# Patient Record
Sex: FEMALE | Race: WHITE | Employment: FULL TIME | URBAN - METROPOLITAN AREA
[De-identification: names, ages, dates, MRNs, and addresses within clinical notes are randomized per-mention and may not be internally consistent; named-entity substitution may affect disease eponyms.]

---

## 2017-11-21 PROBLEM — Z3A.38 38 WEEKS GESTATION OF PREGNANCY: Status: ACTIVE | Noted: 2017-11-21

## 2017-11-30 PROBLEM — Z3A.40 40 WEEKS GESTATION OF PREGNANCY: Status: ACTIVE | Noted: 2017-11-30

## 2021-09-30 ENCOUNTER — HOSPITAL ENCOUNTER (OUTPATIENT)
Age: 31
Discharge: HOME | End: 2021-10-02
Payer: COMMERCIAL

## 2021-09-30 PROCEDURE — 88305 TISSUE EXAM BY PATHOLOGIST: CPT

## 2021-10-14 ENCOUNTER — HOSPITAL ENCOUNTER (EMERGENCY)
Age: 31
Discharge: HOME | End: 2021-10-14
Attending: EMERGENCY MEDICINE
Payer: COMMERCIAL

## 2021-10-14 ENCOUNTER — APPOINTMENT (OUTPATIENT)
Dept: GENERAL RADIOLOGY | Age: 31
End: 2021-10-14
Payer: COMMERCIAL

## 2021-10-14 VITALS
DIASTOLIC BLOOD PRESSURE: 69 MMHG | BODY MASS INDEX: 27.31 KG/M2 | HEIGHT: 64 IN | HEART RATE: 107 BPM | OXYGEN SATURATION: 97 % | WEIGHT: 160 LBS | SYSTOLIC BLOOD PRESSURE: 116 MMHG | RESPIRATION RATE: 14 BRPM | TEMPERATURE: 98.8 F

## 2021-10-14 DIAGNOSIS — M54.41 ACUTE RIGHT-SIDED LOW BACK PAIN WITH RIGHT-SIDED SCIATICA: Primary | ICD-10-CM

## 2021-10-14 LAB
HCG, URINE, POC: NEGATIVE
Lab: NORMAL
NEGATIVE QC PASS/FAIL: NORMAL
POSITIVE QC PASS/FAIL: NORMAL

## 2021-10-14 PROCEDURE — 96372 THER/PROPH/DIAG INJ SC/IM: CPT

## 2021-10-14 PROCEDURE — 6370000000 HC RX 637 (ALT 250 FOR IP): Performed by: EMERGENCY MEDICINE

## 2021-10-14 PROCEDURE — 72100 X-RAY EXAM L-S SPINE 2/3 VWS: CPT

## 2021-10-14 PROCEDURE — 6360000002 HC RX W HCPCS: Performed by: EMERGENCY MEDICINE

## 2021-10-14 PROCEDURE — 99284 EMERGENCY DEPT VISIT MOD MDM: CPT

## 2021-10-14 RX ORDER — KETOROLAC TROMETHAMINE 30 MG/ML
30 INJECTION, SOLUTION INTRAMUSCULAR; INTRAVENOUS ONCE
Status: COMPLETED | OUTPATIENT
Start: 2021-10-14 | End: 2021-10-14

## 2021-10-14 RX ORDER — MORPHINE SULFATE 2 MG/ML
6 INJECTION, SOLUTION INTRAMUSCULAR; INTRAVENOUS ONCE
Status: COMPLETED | OUTPATIENT
Start: 2021-10-14 | End: 2021-10-14

## 2021-10-14 RX ORDER — ORPHENADRINE CITRATE 100 MG/1
100 TABLET, EXTENDED RELEASE ORAL 2 TIMES DAILY
Qty: 20 TABLET | Refills: 0 | Status: SHIPPED | OUTPATIENT
Start: 2021-10-14 | End: 2021-10-24

## 2021-10-14 RX ORDER — OXYCODONE HYDROCHLORIDE AND ACETAMINOPHEN 5; 325 MG/1; MG/1
1 TABLET ORAL EVERY 6 HOURS PRN
Qty: 12 TABLET | Refills: 0 | Status: SHIPPED | OUTPATIENT
Start: 2021-10-14 | End: 2021-10-17

## 2021-10-14 RX ORDER — METHYLPREDNISOLONE 4 MG/1
TABLET ORAL
Qty: 1 KIT | Refills: 0 | Status: SHIPPED | OUTPATIENT
Start: 2021-10-14 | End: 2021-10-20

## 2021-10-14 RX ORDER — PREDNISONE 20 MG/1
60 TABLET ORAL ONCE
Status: COMPLETED | OUTPATIENT
Start: 2021-10-14 | End: 2021-10-14

## 2021-10-14 RX ORDER — ORPHENADRINE CITRATE 30 MG/ML
60 INJECTION INTRAMUSCULAR; INTRAVENOUS ONCE
Status: COMPLETED | OUTPATIENT
Start: 2021-10-14 | End: 2021-10-14

## 2021-10-14 RX ADMIN — ORPHENADRINE CITRATE 60 MG: 30 INJECTION INTRAMUSCULAR; INTRAVENOUS at 08:31

## 2021-10-14 RX ADMIN — PREDNISONE 60 MG: 20 TABLET ORAL at 08:31

## 2021-10-14 RX ADMIN — MORPHINE SULFATE 6 MG: 2 INJECTION, SOLUTION INTRAMUSCULAR; INTRAVENOUS at 09:17

## 2021-10-14 RX ADMIN — KETOROLAC TROMETHAMINE 30 MG: 30 INJECTION, SOLUTION INTRAMUSCULAR at 08:31

## 2021-10-14 ASSESSMENT — PAIN DESCRIPTION - LOCATION: LOCATION: BACK;LEG

## 2021-10-14 ASSESSMENT — PAIN SCALES - GENERAL
PAINLEVEL_OUTOF10: 10
PAINLEVEL_OUTOF10: 9
PAINLEVEL_OUTOF10: 9

## 2021-10-14 ASSESSMENT — PAIN DESCRIPTION - ORIENTATION: ORIENTATION: RIGHT;LOWER

## 2021-10-14 ASSESSMENT — PAIN DESCRIPTION - FREQUENCY: FREQUENCY: CONTINUOUS

## 2021-10-14 ASSESSMENT — PAIN DESCRIPTION - DESCRIPTORS: DESCRIPTORS: PATIENT UNABLE TO DESCRIBE

## 2021-10-14 ASSESSMENT — PAIN DESCRIPTION - PROGRESSION: CLINICAL_PROGRESSION: NOT CHANGED

## 2021-10-14 NOTE — ED PROVIDER NOTES
HPI:  10/14/21, Time: 8:35 AM EDT         [address] is a 33 y.o. female presenting to the ED for back pain beginning several months ago. Symptoms have been moderate to severe in severity, constant, worsening since onset. Symptoms are exacerbated by movement and sitting and alleviated by nothing. She has been taking Motrin as well as 1 Percocet at home without relief. She states she has intermittent tingling to her right lower extremity but no associated symptoms of numbness or weakness. No bowel or bladder incontinence or retention, fevers, saddle anesthesia, or trauma. States that she has been having a sharp pain mainly in her right buttocks radiating down her right leg. She states that she was recently pregnant and had a miscarriage. She underwent D&C recently. States that her vaginal bleeding has resolved. She denies chest pain, shortness of breath, abdominal pain, fevers, nausea, vomiting, diarrhea, and dysuria. She has been seeing a chiropractor, but she has not yet seen a physician. She has a referral to a pain management doctor at [facility] though she has not yet scheduled an appointment. Review of Systems:   Pertinent positives and negatives are stated within HPI, all other systems reviewed and are negative.          --------------------------------------------- PAST HISTORY ---------------------------------------------  Past Medical History:  has a past medical history of Abnormal Pap smear of cervix, HPV (human papilloma virus) anogenital infection, and Kidney stone. Past Surgical History:  has a past surgical history that includes Tympanostomy tube placement (5608) and Colposcopy (2013). Social History:  reports that she has never smoked. She has never used smokeless tobacco. She reports current alcohol use. She reports that she does not use drugs. Family History: family history is not on file. The patients home medications have been reviewed.     Allergies: Patient has no known allergies. -------------------------------------------------- RESULTS -------------------------------------------------  All laboratory and radiology results have been personally reviewed by myself   LABS:  Results for orders placed or performed during the hospital encounter of 10/14/21   POC Pregnancy Urine   Result Value Ref Range    HCG, Urine, POC Negative Negative    Lot Number [id number]     Positive QC Pass/Fail Pass     Negative QC Pass/Fail Pass        RADIOLOGY:  Interpreted by Radiologist.  XR LUMBAR SPINE (2-3 VIEWS)   Final Result   Partially sacralized L5 noted      Otherwise unremarkable study             ------------------------- NURSING NOTES AND VITALS REVIEWED ---------------------------   The nursing notes within the ED encounter and vital signs as below have been reviewed. /69   Pulse 107   Temp 98.8 °F (37.1 °C) (Oral)   Resp 14   Ht 5' 4\" (1.626 m)   Wt 160 lb (72.6 kg)   SpO2 97%   BMI 27.46 kg/m²   Oxygen Saturation Interpretation: Normal      ---------------------------------------------------PHYSICAL EXAM--------------------------------------      Constitutional/General: Alert and oriented x3, appears uncomfortable, non toxic in NAD  Head: Normocephalic and atraumatic  Mouth: Oropharynx clear, handling secretions, no trismus  Neck: Supple, full ROM,   Pulmonary: Lungs clear to auscultation bilaterally, no wheezes, rales, or rhonchi. Not in respiratory distress  Cardiovascular:  Regular rate and rhythm, no murmurs, gallops, or rubs. 2+ distal pulses  Abdomen: Soft, non tender, non distended,   Back: No midline thoracic or lumbar tenderness, tenderness to right sciatic notch, 5/5 strength in lower extremities with normal sensation to lower extremities. Extremities: Moves all extremities x 4. Warm and well perfused  Skin: warm and dry without rash  Neurologic: GCS 15, no focal motor or sensory deficits   Psych: Normal Affect.  Behavior with the plan.      --------------------------------- IMPRESSION AND DISPOSITION ---------------------------------    IMPRESSION  1. Acute right-sided low back pain with right-sided sciatica        DISPOSITION  Disposition: Discharge to home  Patient condition is stable      NOTE: This report was transcribed using voice recognition software.  Every effort was made to ensure accuracy; however, inadvertent computerized transcription errors may be present    I, Millie Lau MD, am the primary provider of this record       Millie Lau MD  10/14/21 2316

## 2022-03-30 ENCOUNTER — APPOINTMENT (OUTPATIENT)
Dept: ULTRASOUND IMAGING | Age: 32
DRG: 831 | End: 2022-03-30
Payer: COMMERCIAL

## 2022-03-30 ENCOUNTER — HOSPITAL ENCOUNTER (INPATIENT)
Age: 32
LOS: 3 days | Discharge: HOME | DRG: 831 | End: 2022-04-03
Attending: EMERGENCY MEDICINE | Admitting: INTERNAL MEDICINE
Payer: COMMERCIAL

## 2022-03-30 DIAGNOSIS — E87.6 HYPOKALEMIA: ICD-10-CM

## 2022-03-30 DIAGNOSIS — R10.9 FLANK PAIN: ICD-10-CM

## 2022-03-30 DIAGNOSIS — N30.00 ACUTE CYSTITIS WITHOUT HEMATURIA: ICD-10-CM

## 2022-03-30 DIAGNOSIS — A41.9 SEPTICEMIA (HCC): Primary | ICD-10-CM

## 2022-03-30 LAB
ALBUMIN SERPL-MCNC: 4.2 G/DL (ref 3.5–5.2)
ALP BLD-CCNC: 36 U/L (ref 35–104)
ALT SERPL-CCNC: 13 U/L (ref 0–32)
ANION GAP SERPL CALCULATED.3IONS-SCNC: 12 MMOL/L (ref 7–16)
AST SERPL-CCNC: 15 U/L (ref 0–31)
BACTERIA: ABNORMAL /HPF
BASOPHILS ABSOLUTE: 0.07 E9/L (ref 0–0.2)
BASOPHILS RELATIVE PERCENT: 0.3 % (ref 0–2)
BILIRUB SERPL-MCNC: 0.2 MG/DL (ref 0–1.2)
BILIRUBIN URINE: NEGATIVE
BLOOD, URINE: NEGATIVE
BUN BLDV-MCNC: 6 MG/DL (ref 6–20)
CALCIUM SERPL-MCNC: 9 MG/DL (ref 8.6–10.2)
CHLORIDE BLD-SCNC: 99 MMOL/L (ref 98–107)
CLARITY: ABNORMAL
CO2: 20 MMOL/L (ref 22–29)
COLOR: YELLOW
CREAT SERPL-MCNC: 0.6 MG/DL (ref 0.5–1)
EOSINOPHILS ABSOLUTE: 0.07 E9/L (ref 0.05–0.5)
EOSINOPHILS RELATIVE PERCENT: 0.3 % (ref 0–6)
EPITHELIAL CELLS, UA: ABNORMAL /HPF
GFR AFRICAN AMERICAN: >60
GFR NON-AFRICAN AMERICAN: >60 ML/MIN/1.73
GLUCOSE BLD-MCNC: 112 MG/DL (ref 74–99)
GLUCOSE URINE: NEGATIVE MG/DL
HCT VFR BLD CALC: 34.6 % (ref 34–48)
HEMOGLOBIN: 12.2 G/DL (ref 11.5–15.5)
IMMATURE GRANULOCYTES #: 0.2 E9/L
IMMATURE GRANULOCYTES %: 0.9 % (ref 0–5)
KETONES, URINE: NEGATIVE MG/DL
LEUKOCYTE ESTERASE, URINE: ABNORMAL
LYMPHOCYTES ABSOLUTE: 0.72 E9/L (ref 1.5–4)
LYMPHOCYTES RELATIVE PERCENT: 3.4 % (ref 20–42)
MAGNESIUM: 1.6 MG/DL (ref 1.6–2.6)
MCH RBC QN AUTO: 33 PG (ref 26–35)
MCHC RBC AUTO-ENTMCNC: 35.3 % (ref 32–34.5)
MCV RBC AUTO: 93.5 FL (ref 80–99.9)
MONOCYTES ABSOLUTE: 0.74 E9/L (ref 0.1–0.95)
MONOCYTES RELATIVE PERCENT: 3.5 % (ref 2–12)
NEUTROPHILS ABSOLUTE: 19.4 E9/L (ref 1.8–7.3)
NEUTROPHILS RELATIVE PERCENT: 91.6 % (ref 43–80)
NITRITE, URINE: NEGATIVE
PDW BLD-RTO: 12.2 FL (ref 11.5–15)
PH UA: 6.5 (ref 5–9)
PLATELET # BLD: 253 E9/L (ref 130–450)
PMV BLD AUTO: 10.7 FL (ref 7–12)
POTASSIUM REFLEX MAGNESIUM: 3.3 MMOL/L (ref 3.5–5)
PROTEIN UA: NEGATIVE MG/DL
RBC # BLD: 3.7 E12/L (ref 3.5–5.5)
RBC UA: ABNORMAL /HPF (ref 0–2)
SODIUM BLD-SCNC: 131 MMOL/L (ref 132–146)
SPECIFIC GRAVITY UA: 1.01 (ref 1–1.03)
TOTAL PROTEIN: 7.1 G/DL (ref 6.4–8.3)
UROBILINOGEN, URINE: 0.2 E.U./DL
WBC # BLD: 21.2 E9/L (ref 4.5–11.5)
WBC UA: ABNORMAL /HPF (ref 0–5)

## 2022-03-30 PROCEDURE — 96361 HYDRATE IV INFUSION ADD-ON: CPT

## 2022-03-30 PROCEDURE — 80053 COMPREHEN METABOLIC PANEL: CPT

## 2022-03-30 PROCEDURE — 76770 US EXAM ABDO BACK WALL COMP: CPT

## 2022-03-30 PROCEDURE — 81001 URINALYSIS AUTO W/SCOPE: CPT

## 2022-03-30 PROCEDURE — 85025 COMPLETE CBC W/AUTO DIFF WBC: CPT

## 2022-03-30 PROCEDURE — 87088 URINE BACTERIA CULTURE: CPT

## 2022-03-30 PROCEDURE — 96374 THER/PROPH/DIAG INJ IV PUSH: CPT

## 2022-03-30 PROCEDURE — 6360000002 HC RX W HCPCS: Performed by: EMERGENCY MEDICINE

## 2022-03-30 PROCEDURE — 96375 TX/PRO/DX INJ NEW DRUG ADDON: CPT

## 2022-03-30 PROCEDURE — 76815 OB US LIMITED FETUS(S): CPT

## 2022-03-30 PROCEDURE — 87186 SC STD MICRODIL/AGAR DIL: CPT

## 2022-03-30 PROCEDURE — 99284 EMERGENCY DEPT VISIT MOD MDM: CPT

## 2022-03-30 PROCEDURE — 83735 ASSAY OF MAGNESIUM: CPT

## 2022-03-30 PROCEDURE — 93976 VASCULAR STUDY: CPT

## 2022-03-30 PROCEDURE — 2580000003 HC RX 258: Performed by: EMERGENCY MEDICINE

## 2022-03-30 RX ORDER — 0.9 % SODIUM CHLORIDE 0.9 %
1000 INTRAVENOUS SOLUTION INTRAVENOUS ONCE
Status: COMPLETED | OUTPATIENT
Start: 2022-03-30 | End: 2022-03-30

## 2022-03-30 RX ORDER — ASPIRIN 81 MG/1
81 TABLET ORAL DAILY
COMMUNITY

## 2022-03-30 RX ORDER — ACETAMINOPHEN 500 MG
1000 TABLET ORAL ONCE
Status: COMPLETED | OUTPATIENT
Start: 2022-03-30 | End: 2022-03-31

## 2022-03-30 RX ORDER — ONDANSETRON 2 MG/ML
4 INJECTION INTRAMUSCULAR; INTRAVENOUS ONCE
Status: COMPLETED | OUTPATIENT
Start: 2022-03-30 | End: 2022-03-30

## 2022-03-30 RX ORDER — FENTANYL CITRATE 50 UG/ML
50 INJECTION, SOLUTION INTRAMUSCULAR; INTRAVENOUS ONCE
Status: COMPLETED | OUTPATIENT
Start: 2022-03-30 | End: 2022-03-30

## 2022-03-30 RX ORDER — 0.9 % SODIUM CHLORIDE 0.9 %
1000 INTRAVENOUS SOLUTION INTRAVENOUS ONCE
Status: COMPLETED | OUTPATIENT
Start: 2022-03-31 | End: 2022-03-31

## 2022-03-30 RX ORDER — LEVOTHYROXINE SODIUM 0.03 MG/1
25 TABLET ORAL DAILY
COMMUNITY

## 2022-03-30 RX ADMIN — SODIUM CHLORIDE 1000 ML: 9 INJECTION, SOLUTION INTRAVENOUS at 22:52

## 2022-03-30 RX ADMIN — FENTANYL CITRATE 50 MCG: 50 INJECTION, SOLUTION INTRAMUSCULAR; INTRAVENOUS at 22:46

## 2022-03-30 RX ADMIN — ONDANSETRON 4 MG: 2 INJECTION INTRAMUSCULAR; INTRAVENOUS at 22:59

## 2022-03-30 ASSESSMENT — PAIN DESCRIPTION - LOCATION: LOCATION: BACK;FLANK

## 2022-03-30 ASSESSMENT — ENCOUNTER SYMPTOMS
VOMITING: 0
EYE REDNESS: 0
ABDOMINAL PAIN: 0
NAUSEA: 0
SHORTNESS OF BREATH: 0

## 2022-03-30 ASSESSMENT — PAIN DESCRIPTION - ORIENTATION: ORIENTATION: RIGHT

## 2022-03-30 ASSESSMENT — PAIN SCALES - GENERAL
PAINLEVEL_OUTOF10: 10
PAINLEVEL_OUTOF10: 10

## 2022-03-30 ASSESSMENT — PAIN DESCRIPTION - PAIN TYPE: TYPE: ACUTE PAIN

## 2022-03-30 ASSESSMENT — PAIN - FUNCTIONAL ASSESSMENT: PAIN_FUNCTIONAL_ASSESSMENT: 0-10

## 2022-03-31 PROBLEM — N13.30 HYDRONEPHROSIS: Status: ACTIVE | Noted: 2022-03-31

## 2022-03-31 PROBLEM — A41.9 SEPSIS (HCC): Status: ACTIVE | Noted: 2022-03-31

## 2022-03-31 LAB
ALBUMIN SERPL-MCNC: 3.3 G/DL (ref 3.5–5.2)
ALP BLD-CCNC: 32 U/L (ref 35–104)
ALT SERPL-CCNC: 13 U/L (ref 0–32)
ANION GAP SERPL CALCULATED.3IONS-SCNC: 11 MMOL/L (ref 7–16)
AST SERPL-CCNC: 17 U/L (ref 0–31)
BASOPHILS ABSOLUTE: 0 E9/L (ref 0–0.2)
BASOPHILS RELATIVE PERCENT: 0 % (ref 0–2)
BILIRUB SERPL-MCNC: 0.3 MG/DL (ref 0–1.2)
BUN BLDV-MCNC: 4 MG/DL (ref 6–20)
CALCIUM SERPL-MCNC: 8 MG/DL (ref 8.6–10.2)
CHLORIDE BLD-SCNC: 106 MMOL/L (ref 98–107)
CO2: 18 MMOL/L (ref 22–29)
CREAT SERPL-MCNC: 0.5 MG/DL (ref 0.5–1)
EOSINOPHILS ABSOLUTE: 0 E9/L (ref 0.05–0.5)
EOSINOPHILS RELATIVE PERCENT: 0 % (ref 0–6)
GFR AFRICAN AMERICAN: >60
GFR NON-AFRICAN AMERICAN: >60 ML/MIN/1.73
GLUCOSE BLD-MCNC: 112 MG/DL (ref 74–99)
HCT VFR BLD CALC: 31.6 % (ref 34–48)
HEMOGLOBIN: 10.9 G/DL (ref 11.5–15.5)
LACTIC ACID, SEPSIS: 1.6 MMOL/L (ref 0.5–1.9)
LYMPHOCYTES ABSOLUTE: 0.38 E9/L (ref 1.5–4)
LYMPHOCYTES RELATIVE PERCENT: 1.7 % (ref 20–42)
MCH RBC QN AUTO: 32.5 PG (ref 26–35)
MCHC RBC AUTO-ENTMCNC: 34.5 % (ref 32–34.5)
MCV RBC AUTO: 94.3 FL (ref 80–99.9)
MONOCYTES ABSOLUTE: 0.77 E9/L (ref 0.1–0.95)
MONOCYTES RELATIVE PERCENT: 3.5 % (ref 2–12)
NEUTROPHILS ABSOLUTE: 18.24 E9/L (ref 1.8–7.3)
NEUTROPHILS RELATIVE PERCENT: 94.8 % (ref 43–80)
NUCLEATED RED BLOOD CELLS: 0 /100 WBC
PDW BLD-RTO: 12.3 FL (ref 11.5–15)
PLATELET # BLD: 202 E9/L (ref 130–450)
PMV BLD AUTO: 10.8 FL (ref 7–12)
POTASSIUM REFLEX MAGNESIUM: 3.6 MMOL/L (ref 3.5–5)
PROCALCITONIN: 0.58 NG/ML (ref 0–0.08)
RBC # BLD: 3.35 E12/L (ref 3.5–5.5)
RBC # BLD: NORMAL 10*6/UL
SODIUM BLD-SCNC: 135 MMOL/L (ref 132–146)
TOTAL PROTEIN: 5.7 G/DL (ref 6.4–8.3)
WBC # BLD: 19.2 E9/L (ref 4.5–11.5)

## 2022-03-31 PROCEDURE — 6360000002 HC RX W HCPCS: Performed by: NURSE PRACTITIONER

## 2022-03-31 PROCEDURE — 36415 COLL VENOUS BLD VENIPUNCTURE: CPT

## 2022-03-31 PROCEDURE — 6370000000 HC RX 637 (ALT 250 FOR IP): Performed by: NURSE PRACTITIONER

## 2022-03-31 PROCEDURE — 96375 TX/PRO/DX INJ NEW DRUG ADDON: CPT

## 2022-03-31 PROCEDURE — 87040 BLOOD CULTURE FOR BACTERIA: CPT

## 2022-03-31 PROCEDURE — 85025 COMPLETE CBC W/AUTO DIFF WBC: CPT

## 2022-03-31 PROCEDURE — 84145 PROCALCITONIN (PCT): CPT

## 2022-03-31 PROCEDURE — 87150 DNA/RNA AMPLIFIED PROBE: CPT

## 2022-03-31 PROCEDURE — 99232 SBSQ HOSP IP/OBS MODERATE 35: CPT | Performed by: INTERNAL MEDICINE

## 2022-03-31 PROCEDURE — 6360000002 HC RX W HCPCS: Performed by: EMERGENCY MEDICINE

## 2022-03-31 PROCEDURE — 6370000000 HC RX 637 (ALT 250 FOR IP): Performed by: EMERGENCY MEDICINE

## 2022-03-31 PROCEDURE — 99223 1ST HOSP IP/OBS HIGH 75: CPT | Performed by: INTERNAL MEDICINE

## 2022-03-31 PROCEDURE — APPSS45 APP SPLIT SHARED TIME 31-45 MINUTES: Performed by: NURSE PRACTITIONER

## 2022-03-31 PROCEDURE — 87186 SC STD MICRODIL/AGAR DIL: CPT

## 2022-03-31 PROCEDURE — 2580000003 HC RX 258: Performed by: EMERGENCY MEDICINE

## 2022-03-31 PROCEDURE — 83605 ASSAY OF LACTIC ACID: CPT

## 2022-03-31 PROCEDURE — 2060000000 HC ICU INTERMEDIATE R&B

## 2022-03-31 PROCEDURE — 80053 COMPREHEN METABOLIC PANEL: CPT

## 2022-03-31 PROCEDURE — 2580000003 HC RX 258: Performed by: NURSE PRACTITIONER

## 2022-03-31 PROCEDURE — APPSS30 APP SPLIT SHARED TIME 16-30 MINUTES: Performed by: NURSE PRACTITIONER

## 2022-03-31 RX ORDER — ONDANSETRON 4 MG/1
4 TABLET, ORALLY DISINTEGRATING ORAL EVERY 8 HOURS PRN
Status: DISCONTINUED | OUTPATIENT
Start: 2022-03-31 | End: 2022-04-03 | Stop reason: HOSPADM

## 2022-03-31 RX ORDER — POTASSIUM CHLORIDE 20 MEQ/1
40 TABLET, EXTENDED RELEASE ORAL ONCE
Status: COMPLETED | OUTPATIENT
Start: 2022-03-31 | End: 2022-03-31

## 2022-03-31 RX ORDER — KETOROLAC TROMETHAMINE 30 MG/ML
30 INJECTION, SOLUTION INTRAMUSCULAR; INTRAVENOUS ONCE
Status: DISCONTINUED | OUTPATIENT
Start: 2022-03-31 | End: 2022-03-31

## 2022-03-31 RX ORDER — ONDANSETRON 2 MG/ML
4 INJECTION INTRAMUSCULAR; INTRAVENOUS EVERY 6 HOURS PRN
Status: DISCONTINUED | OUTPATIENT
Start: 2022-03-31 | End: 2022-04-03 | Stop reason: HOSPADM

## 2022-03-31 RX ORDER — SODIUM CHLORIDE 9 MG/ML
INJECTION, SOLUTION INTRAVENOUS PRN
Status: DISCONTINUED | OUTPATIENT
Start: 2022-03-31 | End: 2022-04-03 | Stop reason: HOSPADM

## 2022-03-31 RX ORDER — LEVOTHYROXINE SODIUM 0.03 MG/1
25 TABLET ORAL DAILY
Status: DISCONTINUED | OUTPATIENT
Start: 2022-03-31 | End: 2022-04-03 | Stop reason: HOSPADM

## 2022-03-31 RX ORDER — PRENATAL WITH FERROUS FUM AND FOLIC ACID 3080; 920; 120; 400; 22; 1.84; 3; 20; 10; 1; 12; 200; 27; 25; 2 [IU]/1; [IU]/1; MG/1; [IU]/1; MG/1; MG/1; MG/1; MG/1; MG/1; MG/1; UG/1; MG/1; MG/1; MG/1; MG/1
1 TABLET ORAL DAILY
Status: DISCONTINUED | OUTPATIENT
Start: 2022-03-31 | End: 2022-04-03 | Stop reason: HOSPADM

## 2022-03-31 RX ORDER — ACETAMINOPHEN 325 MG/1
650 TABLET ORAL EVERY 6 HOURS PRN
Status: DISCONTINUED | OUTPATIENT
Start: 2022-03-31 | End: 2022-04-03 | Stop reason: HOSPADM

## 2022-03-31 RX ORDER — SODIUM CHLORIDE 0.9 % (FLUSH) 0.9 %
5-40 SYRINGE (ML) INJECTION EVERY 12 HOURS SCHEDULED
Status: DISCONTINUED | OUTPATIENT
Start: 2022-03-31 | End: 2022-04-03 | Stop reason: HOSPADM

## 2022-03-31 RX ORDER — POLYETHYLENE GLYCOL 3350 17 G/17G
17 POWDER, FOR SOLUTION ORAL DAILY PRN
Status: DISCONTINUED | OUTPATIENT
Start: 2022-03-31 | End: 2022-04-03 | Stop reason: HOSPADM

## 2022-03-31 RX ORDER — ACETAMINOPHEN 650 MG/1
650 SUPPOSITORY RECTAL EVERY 6 HOURS PRN
Status: DISCONTINUED | OUTPATIENT
Start: 2022-03-31 | End: 2022-04-03 | Stop reason: HOSPADM

## 2022-03-31 RX ORDER — SODIUM CHLORIDE 9 MG/ML
INJECTION, SOLUTION INTRAVENOUS CONTINUOUS
Status: DISCONTINUED | OUTPATIENT
Start: 2022-03-31 | End: 2022-04-02

## 2022-03-31 RX ORDER — SODIUM CHLORIDE 0.9 % (FLUSH) 0.9 %
5-40 SYRINGE (ML) INJECTION PRN
Status: DISCONTINUED | OUTPATIENT
Start: 2022-03-31 | End: 2022-04-03 | Stop reason: HOSPADM

## 2022-03-31 RX ADMIN — HYDROMORPHONE HYDROCHLORIDE 0.5 MG: 1 INJECTION, SOLUTION INTRAMUSCULAR; INTRAVENOUS; SUBCUTANEOUS at 09:08

## 2022-03-31 RX ADMIN — CEFTRIAXONE 1000 MG: 1 INJECTION, POWDER, FOR SOLUTION INTRAMUSCULAR; INTRAVENOUS at 01:08

## 2022-03-31 RX ADMIN — Medication 10 ML: at 21:00

## 2022-03-31 RX ADMIN — POTASSIUM CHLORIDE 40 MEQ: 20 TABLET, EXTENDED RELEASE ORAL at 01:11

## 2022-03-31 RX ADMIN — SODIUM CHLORIDE 1000 ML: 9 INJECTION, SOLUTION INTRAVENOUS at 00:18

## 2022-03-31 RX ADMIN — METFORMIN HYDROCHLORIDE 1 TABLET: 500 TABLET, EXTENDED RELEASE ORAL at 08:49

## 2022-03-31 RX ADMIN — SODIUM CHLORIDE: 9 INJECTION, SOLUTION INTRAVENOUS at 20:13

## 2022-03-31 RX ADMIN — HYDROMORPHONE HYDROCHLORIDE 0.5 MG: 1 INJECTION, SOLUTION INTRAMUSCULAR; INTRAVENOUS; SUBCUTANEOUS at 00:07

## 2022-03-31 RX ADMIN — ACETAMINOPHEN 650 MG: 325 TABLET ORAL at 10:27

## 2022-03-31 RX ADMIN — HYDROMORPHONE HYDROCHLORIDE 0.5 MG: 1 INJECTION, SOLUTION INTRAMUSCULAR; INTRAVENOUS; SUBCUTANEOUS at 02:14

## 2022-03-31 RX ADMIN — ONDANSETRON 4 MG: 2 INJECTION INTRAMUSCULAR; INTRAVENOUS at 09:15

## 2022-03-31 RX ADMIN — SODIUM CHLORIDE: 9 INJECTION, SOLUTION INTRAVENOUS at 02:23

## 2022-03-31 RX ADMIN — ACETAMINOPHEN 1000 MG: 500 TABLET ORAL at 00:20

## 2022-03-31 RX ADMIN — ACETAMINOPHEN 650 MG: 325 TABLET ORAL at 18:30

## 2022-03-31 RX ADMIN — LEVOTHYROXINE SODIUM 25 MCG: 25 TABLET ORAL at 08:49

## 2022-03-31 ASSESSMENT — PAIN DESCRIPTION - DIRECTION: RADIATING_TOWARDS: RIGHT FLANK PAIN

## 2022-03-31 ASSESSMENT — PAIN DESCRIPTION - ONSET
ONSET: PROGRESSIVE
ONSET: PROGRESSIVE
ONSET: GRADUAL

## 2022-03-31 ASSESSMENT — PAIN DESCRIPTION - ORIENTATION
ORIENTATION: RIGHT;LOWER

## 2022-03-31 ASSESSMENT — PAIN DESCRIPTION - FREQUENCY
FREQUENCY: INTERMITTENT

## 2022-03-31 ASSESSMENT — PAIN SCALES - GENERAL
PAINLEVEL_OUTOF10: 0
PAINLEVEL_OUTOF10: 10
PAINLEVEL_OUTOF10: 3
PAINLEVEL_OUTOF10: 5
PAINLEVEL_OUTOF10: 3
PAINLEVEL_OUTOF10: 3
PAINLEVEL_OUTOF10: 9
PAINLEVEL_OUTOF10: 10

## 2022-03-31 ASSESSMENT — ENCOUNTER SYMPTOMS
ALLERGIC/IMMUNOLOGIC NEGATIVE: 1
RESPIRATORY NEGATIVE: 1
GASTROINTESTINAL NEGATIVE: 1
EYES NEGATIVE: 1

## 2022-03-31 ASSESSMENT — PAIN DESCRIPTION - LOCATION
LOCATION: ABDOMEN
LOCATION: FLANK
LOCATION: HEAD
LOCATION: ABDOMEN

## 2022-03-31 ASSESSMENT — PAIN DESCRIPTION - DESCRIPTORS
DESCRIPTORS: SHARP;CONSTANT;NAGGING
DESCRIPTORS: ACHING
DESCRIPTORS: ACHING
DESCRIPTORS: ACHING;SORE;DISCOMFORT

## 2022-03-31 ASSESSMENT — PAIN DESCRIPTION - PAIN TYPE
TYPE: ACUTE PAIN

## 2022-03-31 ASSESSMENT — PAIN - FUNCTIONAL ASSESSMENT
PAIN_FUNCTIONAL_ASSESSMENT: PREVENTS OR INTERFERES SOME ACTIVE ACTIVITIES AND ADLS
PAIN_FUNCTIONAL_ASSESSMENT: ACTIVITIES ARE NOT PREVENTED

## 2022-03-31 NOTE — ED PROVIDER NOTES
Chief complaint: Flank pain      HPI:  3/30/22, Time: 10:56 PM EDT    HPI             Luis Barnett is a 27 y.o. female presenting to the ED for flank pain. The history is obtained from the patient as well as the patient's medical record. The patient is presenting to the emergency department the chief complaint of flank pain. The patient reports that she began over the last week with flank pain. She states that the pain significantly worsened a few hours prior to arrival in the emergency department. The pain is located in the right flank and radiates down into her suprapubic region. The pain is described as sharp. Nothing makes it better. Nothing makes it worse. The patient has not tried any treatments for the pain. She is currently 14 weeks pregnant. No complications of pregnancy today. She denies any vaginal bleeding or spotting. She does admit to associated nausea with emesis. She does have a history of kidney stones. ROS:   Review of Systems   Constitutional: Negative for chills and fatigue. HENT: Negative for congestion. Eyes: Negative for redness. Respiratory: Negative for shortness of breath. Cardiovascular: Negative for chest pain. Gastrointestinal: Negative for abdominal pain, nausea and vomiting. Genitourinary: Positive for flank pain. Negative for dysuria. Musculoskeletal: Negative for arthralgias. Skin: Negative for rash. Neurological: Negative for light-headedness. Psychiatric/Behavioral: Negative for confusion. All other systems reviewed and are negative.      --------------------------------------------- PAST HISTORY ---------------------------------------------  Past Medical History:  has a past medical history of Abnormal Pap smear of cervix, HPV (human papilloma virus) anogenital infection, and Kidney stone. Past Surgical History:  has a past surgical history that includes Tympanostomy tube placement (9738) and Colposcopy (2013).     Social History: reports that she has never smoked. She has never used smokeless tobacco. She reports current alcohol use. She reports that she does not use drugs. Family History: family history is not on file. The patients home medications have been reviewed. Allergies: Patient has no known allergies. ---------------------------------------------------PHYSICAL EXAM--------------------------------------    Constitutional/General: Alert and oriented x3, moderate amount of distress secondary to pain  Head: Normocephalic and atraumatic  Mouth: Oropharynx clear, handling secretions, no trismus  Neck: Supple, full ROM,  Pulmonary: Lungs clear to auscultation bilaterally, no wheezes, rales, or rhonchi. Not in respiratory distress  Cardiovascular:  Regular rate. Regular rhythm. No murmurs  Chest: no chest wall tenderness  Abdomen: Soft. Moderately tender in right lower quadrant and over the right flank  Musculoskeletal: Moves all extremities x 4. Warm and well perfused, no clubbing, cyanosis, or edema. Capillary refill <3 seconds  Skin: warm and dry. No rashes. Neurologic: GCS 15, no gross focal neurologic deficits  Psych: Normal Affect    -------------------------------------------------- RESULTS -------------------------------------------------  I have personally reviewed all laboratory and imaging results for this patient. Results are listed below.      LABS:  Results for orders placed or performed during the hospital encounter of 03/30/22   CBC with Auto Differential   Result Value Ref Range    WBC 21.2 (H) 4.5 - 11.5 E9/L    RBC 3.70 3.50 - 5.50 E12/L    Hemoglobin 12.2 11.5 - 15.5 g/dL    Hematocrit 34.6 34.0 - 48.0 %    MCV 93.5 80.0 - 99.9 fL    MCH 33.0 26.0 - 35.0 pg    MCHC 35.3 (H) 32.0 - 34.5 %    RDW 12.2 11.5 - 15.0 fL    Platelets 655 161 - 813 E9/L    MPV 10.7 7.0 - 12.0 fL    Neutrophils % 91.6 (H) 43.0 - 80.0 %    Immature Granulocytes % 0.9 0.0 - 5.0 %    Lymphocytes % 3.4 (L) 20.0 - 42.0 %    Monocytes % 3.5 2.0 - 12.0 %    Eosinophils % 0.3 0.0 - 6.0 %    Basophils % 0.3 0.0 - 2.0 %    Neutrophils Absolute 19.40 (H) 1.80 - 7.30 E9/L    Immature Granulocytes # 0.20 E9/L    Lymphocytes Absolute 0.72 (L) 1.50 - 4.00 E9/L    Monocytes Absolute 0.74 0.10 - 0.95 E9/L    Eosinophils Absolute 0.07 0.05 - 0.50 E9/L    Basophils Absolute 0.07 0.00 - 0.20 E9/L   Comprehensive Metabolic Panel w/ Reflex to MG   Result Value Ref Range    Sodium 131 (L) 132 - 146 mmol/L    Potassium reflex Magnesium 3.3 (L) 3.5 - 5.0 mmol/L    Chloride 99 98 - 107 mmol/L    CO2 20 (L) 22 - 29 mmol/L    Anion Gap 12 7 - 16 mmol/L    Glucose 112 (H) 74 - 99 mg/dL    BUN 6 6 - 20 mg/dL    CREATININE 0.6 0.5 - 1.0 mg/dL    GFR Non-African American >60 >=60 mL/min/1.73    GFR African American >60     Calcium 9.0 8.6 - 10.2 mg/dL    Total Protein 7.1 6.4 - 8.3 g/dL    Albumin 4.2 3.5 - 5.2 g/dL    Total Bilirubin 0.2 0.0 - 1.2 mg/dL    Alkaline Phosphatase 36 35 - 104 U/L    ALT 13 0 - 32 U/L    AST 15 0 - 31 U/L   Urinalysis   Result Value Ref Range    Color, UA Yellow Straw/Yellow    Clarity, UA CLOUDY (A) Clear    Glucose, Ur Negative Negative mg/dL    Bilirubin Urine Negative Negative    Ketones, Urine Negative Negative mg/dL    Specific Gravity, UA 1.015 1.005 - 1.030    Blood, Urine Negative Negative    pH, UA 6.5 5.0 - 9.0    Protein, UA Negative Negative mg/dL    Urobilinogen, Urine 0.2 <2.0 E.U./dL    Nitrite, Urine Negative Negative    Leukocyte Esterase, Urine SMALL (A) Negative   Magnesium   Result Value Ref Range    Magnesium 1.6 1.6 - 2.6 mg/dL   Microscopic Urinalysis   Result Value Ref Range    WBC, UA 5-10 (A) 0 - 5 /HPF    RBC, UA 1-3 0 - 2 /HPF    Epithelial Cells, UA MANY /HPF    Bacteria, UA MANY (A) None Seen /HPF       RADIOLOGY:  Interpreted by Radiologist.  US DUP ABD PEL RETRO SCROT LIMITED   Final Result   Single live intrauterine pregnancy with gestational age of 14 weeks 3 days by   current sonographic biometry.   The estimated due date is 9/25/2022. US OB 1 OR MORE FETUS LIMITED   Final Result   Single live intrauterine pregnancy with gestational age of 12 weeks 3 days by   current sonographic biometry. The estimated due date is 9/25/2022. US RETROPERITONEAL COMPLETE   Final Result   Mild-to-moderate right hydronephrosis. No definite intrauterine stone   identified.                 ------------------------- NURSING NOTES AND VITALS REVIEWED ---------------------------   The nursing notes within the ED encounter and vital signs as below have been reviewed by myself. /75   Pulse 113   Temp 98.3 °F (36.8 °C)   Resp 18   LMP 12/23/2021   SpO2 100%   Oxygen Saturation Interpretation: Normal    The patients available past medical records and past encounters were reviewed. ------------------------------ ED COURSE/MEDICAL DECISION MAKING----------------------  Medications   0.9 % sodium chloride bolus (1,000 mLs IntraVENous New Bag 3/31/22 0018)   potassium chloride (KLOR-CON M) extended release tablet 40 mEq (has no administration in time range)   cefTRIAXone (ROCEPHIN) 1,000 mg in sterile water 10 mL IV syringe (has no administration in time range)   fentaNYL (SUBLIMAZE) injection 50 mcg (50 mcg IntraVENous Given 3/30/22 2246)   0.9 % sodium chloride bolus (1,000 mLs IntraVENous New Bag 3/30/22 2252)   ondansetron (ZOFRAN) injection 4 mg (4 mg IntraVENous Given 3/30/22 2259)   acetaminophen (TYLENOL) tablet 1,000 mg (1,000 mg Oral Given 3/31/22 0020)   HYDROmorphone (DILAUDID) injection 0.5 mg (0.5 mg IntraVENous Given 3/31/22 0007)     PROCEDURE NOTE   3/30/22       Time: 2245  PELVIS / OB-GYN BEDSIDE ULTRASOUND   Risks, benefits and alternatives (for applicable procedures below) described. Performed By: EM Attending Physician. Indication:  Abdominal pain and Pregnancy.   Informed consent: The patient was counseled regarding the procedure, it's indications, risks, potential complications and alternatives and any questions were answered. Consent was obtained. .  Procedure:  Probe type: abdominal.  Findings:  Second/third trimester findings:Fetal  cardiac activity and movement present             Medical Decision Making:   I, Dr. Rufina Latham am the primary physician of record. [address] [place] Karen is a 27 y.o. female who presents to the ED for flank pain. The patient did have labs and imaging which were reviewed. The patient was found to have right-sided hydronephrosis on renal ultrasound. She is clinically present as potential ureteral stone although 1 was not seen on ultrasound imaging. She does have leukocytosis as well as urinary tract infection. The patient did have blood cultures which were obtained as well as urine culture. Urology consultation. OB/GYN consultation  Patient will be admitted to medicine for further treatment and evaluation. Re-Evaluations/Consultations:             ED Course as of 22 0104   Wed Mar 30, 2022   2338 Patient still complaining of pain. Patient will be remedicated. [MT]   Thu Mar 31, 2022   002 Spoke with Dr. Fela Jung he states that as there is no stone this could be hydronephrosis with pregnancy. No indication for emergent stent at this time. If patient is admitted patient is to be made n.p.o. and can be evaluated for potential need for stent. [MT]   0028 Spoke with Dr. Gali Cotto discussed the case. He states that he will provide consultation and patient is to be admitted to medicine. [MT]    Spoke with Dr. Jacek Burr he will accept the patient for admission. [MT]      ED Course User Index  [MT] Deysi , DO               This patient's ED course included: History, physical examination, reevaluation prior to disposition, labs, imaging, IV medication    This patient has remained hemodynamically stable during their ED course. Counseling:    The emergency provider has spoken with the patient and discussed todays results, in addition to providing specific details for the plan of care and counseling regarding the diagnosis and prognosis. Questions are answered at this time and they are agreeable with the plan.       --------------------------------- IMPRESSION AND DISPOSITION ---------------------------------    IMPRESSION  1. Septicemia ([address]    2. Acute cystitis without hematuria    3. Flank pain    4. Hypokalemia        DISPOSITION  Disposition: Admit to telemetry  Patient condition is stable        NOTE: This report was transcribed using voice recognition software.  Every effort was made to ensure accuracy; however, inadvertent computerized transcription errors may be present         Veronica Mohan DO  03/31/22 0104

## 2022-03-31 NOTE — PLAN OF CARE
Problem: Pain:  Goal: Pain level will decrease  3/31/2022 1116 by Amanuel Avina RN  Outcome: Met This Shift  3/31/2022 0247 by Hao Ayala RN  Outcome: Met This Shift  Goal: Control of acute pain  3/31/2022 1116 by Amanuel Avina RN  Outcome: Met This Shift  3/31/2022 0247 by Hao Ayala RN  Outcome: Met This Shift  Goal: Control of chronic pain  3/31/2022 1116 by Amanuel Avina RN  Outcome: Met This Shift  3/31/2022 0247 by Hao Ayala RN  Outcome: Met This Shift

## 2022-03-31 NOTE — PROGRESS NOTES
[facility] Progress Note    Admitting Date and Time: 3/30/2022 10:28 PM  Admit Dx: Hydronephrosis [N13.30]  Hypokalemia [E87.6]  Septicemia ([address] [A41.9]  Flank pain [R10.9]  Acute cystitis without hematuria [N30.00]  Sepsis ([address] [A41.9]      Assessment:    Principal Problem:    Sepsis ([address]  Active Problems:    Hydronephrosis    Hypokalemia    Hyponatremia    Acidosis  Resolved Problems:    * No resolved hospital problems. *      Plan:  1. Right hydronephrosis  - presented with gradual onset right flank pain with acute worsening day of admission. Hx of renal calculi years ago, similar discomfort. Noted malodorous urine and incontinence - but not dysuria  - renal sonogram right hydronephrosis - no obvious calculi reported intrarenally. - UA with cloudy appearance, LE +ve and some bacteria (in setting of pregnancy)  Currently on ceftriaxone, awaiting urine culture. Urology consulted  Continue IV opiate pain control and PRN zofran  IV hydration    2. 14 weeks gestation  - OB consulted. - continue vitami supplements    3. Hypothyroid  - levothyroxine      Subjective:  Patient is being followed for Hydronephrosis [N13.30]  Hypokalemia [E87.6]  Septicemia ([address] [A41.9]  Flank pain [R10.9]  Acute cystitis without hematuria [N30.00]  Sepsis ([address] [A41.9]     Feeling better with IV opiate analgesia and antiemetics. Reluctant to consider Toradol in pregnancy - Still with intermittent severe right flank pain  Denies bladder pain or dysuria  No fever/chills overnight    ROS: denies fever, chills, cp, sob, n/v, HA unless stated above.       [START ON 4/1/2022] cefTRIAXone (ROCEPHIN) IV  1,000 mg IntraVENous Once    HYDROmorphone  0.5 mg IntraVENous Once    sodium chloride flush  5-40 mL IntraVENous 2 times per day    prenatal vitamin  1 tablet Oral Daily    levothyroxine  25 mcg Oral Daily     HYDROmorphone, 0.5 mg, Q3H PRN  sodium chloride flush, 5-40 mL, PRN  sodium chloride, , PRN  ondansetron, 4 mg, Q8H PRN   Or  ondansetron, 4 mg, Q6H PRN  acetaminophen, 650 mg, Q6H PRN   Or  acetaminophen, 650 mg, Q6H PRN  polyethylene glycol, 17 g, Daily PRN         Objective:    /63   Pulse 114   Temp 97.4 °F (36.3 °C) (Oral)   Resp 18   Ht 5' 4\" (1.626 m)   Wt 165 lb (74.8 kg)   LMP 12/23/2021   SpO2 100%   BMI 28.32 kg/m²     General Appearance: alert and oriented to person, place and time and in no acute distress  Skin: warm and dry  Head: normocephalic and atraumatic  Eyes: pupils equal, round, and reactive to light, extraocular eye movements intact, conjunctivae normal  Neck: neck supple and non tender without mass   Pulmonary/Chest: clear to auscultation bilaterally- no wheezes, rales or rhonchi, normal air movement, no respiratory distress  Cardiovascular: normal rate, normal S1 and S2 and no carotid bruits  Abdomen: soft, non-tender, non-distended, normal bowel sounds, no masses or organomegaly  Back: right CVA tenderness with percussion  Extremities: no cyanosis, no clubbing and no edema  Neurologic: no cranial nerve deficit and speech normal        Recent Labs     03/30/22 2237 03/31/22  0644   * 135   K 3.3* 3.6   CL 99 106   CO2 20* 18*   BUN 6 4*   CREATININE 0.6 0.5   GLUCOSE 112* 112*   CALCIUM 9.0 8.0*       Recent Labs     03/30/22 2237 03/31/22  0644   WBC 21.2* 19.2*   RBC 3.70 3.35*   HGB 12.2 10.9*   HCT 34.6 31.6*   MCV 93.5 94.3   MCH 33.0 32.5   MCHC 35.3* 34.5   RDW 12.2 12.3    202   MPV 10.7 10.8       Radiology:     FINDINGS:   Kidneys: The right kidney measures 12.0 cm in length and the left kidney measures 11.9 cm in length. Kidneys demonstrate normal cortical echogenicity. There is mild-to-moderate right hydronephrosis. No evidence of intrarenal stones. Bladder:   Unremarkable appearance of the bladder. FINDINGS: A single live intrauterine pregnancy is present. Fetal heart rate measures 159 beats per minute.  There is normal fetal body and limb movement. The fetus is in cephalic position. The placenta is located posteriorly. BPD measures 2.48 cm. Head circumference measures 9.24 cm. Abdominal circumference measures 8.97 cm. Femur length measures 1.43 cm. Estimated fetal weight is 102.76 grams which correlates to 87.0 percentile based upon last menstrual period. Estimated gestational age by current ultrasound is 14 weeks 3 days. NOTE: This report was transcribed using voice recognition software. Every effort was made to ensure accuracy; however, inadvertent computerized transcription errors may be present.   Electronically signed by LUKE Alvarez CNP on 3/31/2022 at 10:22 AM

## 2022-03-31 NOTE — H&P
[facility] History and Physical    CHIEF COMPLAINT:    Flank pain    History of Present Illness:    Ms Madonna Oneil presented to the ED for sharp flank pain that began over the last week but significantly worsened a few hours prior to arrival in the emergency department. The pain is located in the right flank and radiates down into her suprapubic region and associated with emesis. Her urine is foul smelling, compared to ammonia. She is currently 14 weeks pregnant so CT imaging was avoiding favoring instead ultrasound which showed moderate right sided hydronephrosis. She does have a history of kidney stones and there is concern for obstructive process. Urology was contacted in the ED and will evaluate. Informant(s) for H&P: Patient    REVIEW OF SYSTEMS:  Review of Systems   Constitutional: Positive for chills and fever. HENT: Negative. Eyes: Negative. Respiratory: Negative. Cardiovascular: Negative. Gastrointestinal: Negative. Endocrine: Negative. Genitourinary:        Foul smelling urine   Musculoskeletal: Negative. Skin: Negative. Allergic/Immunologic: Negative. Neurological: Negative. Hematological: Negative. Psychiatric/Behavioral: Negative. PMH:  Past Medical History:   Diagnosis Date    Abnormal Pap smear of cervix 2013    HPV once; normal since    HPV (human papilloma virus) anogenital infection 2013    Kidney stone 2005       Surgical History:  Past Surgical History:   Procedure Laterality Date    COLPOSCOPY  2013    TYMPANOSTOMY TUBE PLACEMENT  1991       Medications Prior to Admission:    Prior to Admission medications    Medication Sig Start Date End Date Taking?  Authorizing Provider   aspirin 81 MG EC tablet Take 81 mg by mouth daily   Yes Historical Provider, MD   levothyroxine (SYNTHROID) 25 MCG tablet Take 25 mcg by mouth Daily   Yes Historical Provider, MD   ibuprofen (ADVIL;MOTRIN) 800 MG tablet Take 1 tablet by mouth every 8 hours as needed for Pain  Patient not taking: Reported on 3/30/2022 12/2/17   Gelacio Zimmerman MD   Prenatal Vit-Fe Fumarate-FA (PRENATAL VITAMIN) 27-0.8 MG TABS Take 1 tablet by mouth daily    Historical Provider, MD       Allergies:    Patient has no known allergies. Social History:    reports that she has never smoked. She has never used smokeless tobacco. She reports current alcohol use. She reports that she does not use drugs. Family History:   family history is not on file. PHYSICAL EXAM:  Vitals:  /75   Pulse 113   Temp 98.3 °F (36.8 °C)   Resp 18   LMP 12/23/2021   SpO2 100%     General Appearance: alert and oriented to person, place and time and in significant pain  Skin: warm and dry  Head: normocephalic and atraumatic  Eyes: pupils equal, round, and reactive to light, extraocular eye movements intact, conjunctivae normal  Neck: neck supple and non tender without mass   Pulmonary/Chest: clear to auscultation bilaterally- no wheezes, rales or rhonchi, normal air movement, no respiratory distress  Cardiovascular: normal rate, normal S1 and S2 and no carotid bruits  Abdomen: soft, non-tender, non-distended, normal bowel sounds, no masses or organomegaly  Extremities: no cyanosis, no clubbing and no edema  Neurologic: no cranial nerve deficit and speech normal    LABS:  Recent Labs     03/30/22  2237   *   K 3.3*   CL 99   CO2 20*   BUN 6   CREATININE 0.6   GLUCOSE 112*   CALCIUM 9.0       Recent Labs     03/30/22  2237   WBC 21.2*   RBC 3.70   HGB 12.2   HCT 34.6   MCV 93.5   MCH 33.0   MCHC 35.3*   RDW 12.2      MPV 10.7       Radiology:   US DUP ABD PEL RETRO SCROT LIMITED   Final Result   Single live intrauterine pregnancy with gestational age of 12 weeks 3 days by   current sonographic biometry. The estimated due date is 9/25/2022.          US OB 1 OR MORE FETUS LIMITED   Final Result   Single live intrauterine pregnancy with gestational age of 12 weeks 3 days by current sonographic biometry. The estimated due date is 9/25/2022. US RETROPERITONEAL COMPLETE   Final Result   Mild-to-moderate right hydronephrosis. No definite intrauterine stone   identified. ASSESSMENT:      Principal Problem:    Sepsis ([address]  Resolved Problems:    * No resolved hospital problems. *      PLAN:    1. Sepsis 2/2 urinary tract infection  - Hydronephrosis with hx stones c/f ureteral obstruction  - Urology consulted in the ED  - Blood and urine cx pending, check procal, lactate  - Continue ceftriaxone  - Pain - dilaudid  - MIVF with NS    2. Intrauterine pregnancy  - 14 weeks gestation  - Prenatal vitamin  - OB consulted in the ED    3. Hyponatremia likely hypovolemic  - Repeat in AM after fluids administered    4. Hx hypothyroid  - Resume synthroid    Code Status: Full  DVT prophylaxis: SCDs  Activity: Up as tolerated to commode  Nutrition: NPO until urology evaluates    NOTE: This report was transcribed using voice recognition software. Every effort was made to ensure accuracy; however, inadvertent computerized transcription errors may be present. Electronically signed by LUKE Ariza CNP on 3/31/2022 at 1:22 AM  HOSPITALIST [address] NOTE 3/31/2022 [id number]:    Details of the evaluation - subjective assessment (including medication profile, past medical, family and social history when applicable), examination, review of lab and test data, diagnostic impressions and medical decision making - performed by LUKE Ariza CNP, were discussed with me on the date of service and I agree with clinical information herein unless otherwise noted. The patient has been evaluated by me personally earlier today. Pt reports no fevers, chills,n/v currently.          PHYSICAL EXAM:    Vitals:  BP (!) 108/56   Pulse 121   Temp 99.1 °F (37.3 °C) (Oral)   Resp 22   Ht 5' 4\" (1.626 m)   Wt 165 lb (74.8 kg)   LMP 12/23/2021   SpO2 97%   BMI 28.32 kg/m² General:  Appears comfortable. Answers questions appropriately and cooperative with exam  HEENT:  Mucous membranes moist. No erythema, rhinorrhea, or post-nasal drip noted. Neck:  No carotid bruits. Heart:  Rhythm regular at rate of 110  Lungs:  CTA. No wheeze, rales, or rhonchi  Abdomen:  Positive bowel sounds positive. Soft. Non-tender. No guarding, rebound or rigidity. Breast/Rectal/Genitourinary: not pertinent. Extremities:  Negative for lower extremity edema  Skin:  Warm and dry  Vascular: 2/4 Dorsalis Pedis pulses bilaterally. Neuro:  Cranial nerves 2-12 grossly intact, no focal weakness or change in sensation noted. Extraocular muscles intact. Pupils equal, round, reactive to light. I agree with the assessment and plan of LUKE Verdugo CNP    Sepsis(leukocytosis, tachycardia, infection)POA  Possible complicated uti with possible calculus or pyelonephritis given flank pain  Right hydronephrosis with possible calculus   Hyponatremia  Acidosis  dehydration  Hypokalemia  pregnancy      Electronically signed by Johan Anne D.O.   Hospitalist  [facility] Hospitalist [facility] at [facility]

## 2022-03-31 NOTE — PROGRESS NOTES
Patient complaining that dilaudid given at [facility] off right away. \" Patient crying and rating pain 10/10. Oral tylenol administered. Dr. Hollie Thompson notified of pt complaints. One time order for IV toradol received. Upon notifying pt and spouse, they said they are uncomfortable with receiving toradol during pregnancy. Pharmacy confirms that IV toradol is ok to administer up to 20 weeks gestation. Patient declines toradol. Dr. Hollie Thompson aware and orders to increase dilaudid received.

## 2022-03-31 NOTE — ED TRIAGE NOTES
FIRST PROVIDER CONTACT ASSESSMENT NOTE      Department of Emergency Medicine   Admit Date: No admission date for patient encounter. Chief Complaint: Flank Pain (right flank pain onset 1700, hx kidney stones,14w pregnant)      History of Present Illness:    Connor Kelly is a 27 y.o. female who presents to the ED for right lower back and right lower quadrant pain. Patient is an OB  4, para 2, miscarriage 1, currently 14 weeks pregnant. Patient complains of sudden onset of right lower flank back pain with radiation into the right lower quadrant of the abdomen around 4:00 this afternoon. She rates it a 10 out of 10. Positive nausea. Patient also believes she is leaking urine.   Patient denies any vaginal bleeding.        -----------------END OF FIRST PROVIDER CONTACT ASSESSMENT NOTE--------------  Electronically signed by TRINA Powell   DD: 3/30/22

## 2022-03-31 NOTE — CONSULTS
3/31/2022 10:08 AM  Service: Urology  Group: [facility] (Issa/Sander/Chad)    Aureliano Poll  [phone]     Chief Complaint:    Right hydronephrosis    History of Present Illness: The patient is a 27 y.o. female patient not known to [facility] who is 14 weeks pregnant and presented to the ED yesterday with right flank pain radiating into her suprapubic area. We are being asked to see her for mild-to-moderate right hydronephrosis identified on retroperitoneal ultrasound. No stones were identified on ultrasound. Her right flank pain started this past week and has been progressive. Her urine was noted foul-smelling on admission. She does have a Hx UTIs throughout previous pregnancy. She does have a Hx kidney stone with last 17 years ago and passed without Sx intervention. She has no azotemia, + leukocytosis (19.2), and with an elevated procalcitonin (0.58). She did have a one time dose of Rocephin since admission. She denies gross hematuria and does have to strain to urinate. She reports no nausea, vomiting, fever, chills at this time.  at bedside at consult. Past Medical History:   Diagnosis Date    Abnormal Pap smear of cervix 2013    HPV once; normal since    HPV (human papilloma virus) anogenital infection 2013    Kidney stone 2005         Past Surgical History:   Procedure Laterality Date    COLPOSCOPY  2013    TYMPANOSTOMY TUBE PLACEMENT  1991       Medications Prior to Admission:    Medications Prior to Admission: aspirin 81 MG EC tablet, Take 81 mg by mouth daily  levothyroxine (SYNTHROID) 25 MCG tablet, Take 25 mcg by mouth Daily  ibuprofen (ADVIL;MOTRIN) 800 MG tablet, Take 1 tablet by mouth every 8 hours as needed for Pain (Patient not taking: Reported on 3/30/2022)  Prenatal Vit-Fe Fumarate-FA (PRENATAL VITAMIN) 27-0.8 MG TABS, Take 1 tablet by mouth daily    Allergies:    Patient has no known allergies. Social History:    reports that she has never smoked.  She has never used smokeless tobacco. She reports current alcohol use. She reports that she does not use drugs. Family History:   Kidney stones in multiple family members  family history is not on file. Review of Systems:  Constitutional: no fever or chills currently  Respiratory: negative for cough and hemoptysis  Cardiovascular: negative for chest pain and dyspnea  Gastrointestinal: negative for abdominal pain, diarrhea, nausea and vomiting   Derm: negative for rash and skin lesion(s)  Neurological: negative for seizures and tremors  Musculoskeletal: Negative    Psychiatric: Negative   : As above in the HPI, otherwise negative  All other reviews are negative    Physical Exam:     Vitals:  /63   Pulse 114   Temp 97.4 °F (36.3 °C) (Oral)   Resp 18   Ht 5' 4\" (1.626 m)   Wt 165 lb (74.8 kg)   LMP 2021   SpO2 100%   BMI 28.32 kg/m²     General:  Awake, alert, oriented X 3. Some mild discomfort to Right flank/ABD  HEENT:  Normocephalic, atraumatic. Lungs:  Respirations symmetric and non-labored. Abdomen:    Extremities:  No clubbing, cyanosis, or edema  Skin:  Warm and dry, no open lesions or rashes  Neuro:  There are no motor or sensory deficits in the 4 quadrant extremities   Rectal: deferred  Genitourinary:  No rodríguez    Labs:     Recent Labs     22  0644   WBC 21.2* 19.2*   RBC 3.70 3.35*   HGB 12.2 10.9*   HCT 34.6 31.6*   MCV 93.5 94.3   MCH 33.0 32.5   MCHC 35.3* 34.5   RDW 12.2 12.3    202   MPV 10.7 10.8         Recent Labs     22  0644   CREATININE 0.6 0.5       Imaging:     Narrative   EXAMINATION:   RETROPERITONEAL ULTRASOUND OF THE KIDNEYS AND URINARY BLADDER       3/30/2022       COMPARISON:   None       HISTORY:   ORDERING SYSTEM PROVIDED HISTORY: pain   TECHNOLOGIST PROVIDED HISTORY:       Reason for exam:->pain   What reading provider will be dictating this exam?->CRC       FINDINGS:       Kidneys:       The right kidney measures 12.0 cm in length and the left kidney measures 11.9   cm in length.       Kidneys demonstrate normal cortical echogenicity.  There is mild-to-moderate   right hydronephrosis.  No evidence of intrarenal stones.           Bladder:       Unremarkable appearance of the bladder.           Impression   Mild-to-moderate right hydronephrosis.  No definite intrauterine stone   identified. Assessment/plan:  Right Hydronephrosis  Pregnancy 14 week gestation      Strain urine  Urine Cx pending  ATB per primary  Watch leukocytosis  Watch creatinine  Renal ultrasound reviewed, right hydronephrosis not uncommon during pregnancy  We will continue to observe the patient at this time. As long as she remains hemodynamically stable, we will hold on stent insertion. We did discuss that if her pain is uncontrolled or she becomes hemodynamically unstable then stenting will need considered. Encourage fluids  Monitor VS parameters  Will follow    Electronically signed by LUKE Castle CNP on 3/31/2022 at 10:08 AM     I agree with the assessment and plan of PERNELL Castle. I personally evaluated the patient and made any changes to reflect my impression and plan. Likely physiologic hydronephrosis of pregnancy. Due to pregnancy imaging modalities are limited. CT is contraindicated due to potential radiation exposure to the fetus. We will continue to follow clinically and if any hemodynamic decline will attempt stent insertion at that time. Risks and benefits discussed with [facility] and she agrees with this plan. White blood cell count decreasing April 1 showing improvement of infection.

## 2022-03-31 NOTE — PROGRESS NOTES
[facility] Quality Flow/Interdisciplinary Rounds Progress Note        Quality Flow Rounds held on March 31, 2022    Disciplines Attending:  Bedside Nurse, ,  and Nursing Unit [address] Prashant Jeong was admitted on 3/30/2022 10:28 PM    Anticipated Discharge Date:  Expected Discharge Date: 04/03/22    Disposition:    Behzad Score:  Behzad Scale Score: 22    Readmission Risk              Risk of Unplanned Readmission:  6           Discussed patient goal for the day, patient clinical progression, and barriers to discharge. The following Goal(s) of the Day/Commitment(s) have been identified:  Advance diet as tolerated, check Urology plan, OB-GYN plans.       Pradeep Vitale RN  March 31, 2022

## 2022-03-31 NOTE — CONSULTS
Dr. Doreen Abel requested obstetrical consult.     BP (!) 99/49   Pulse 122   Temp 98 °F (36.7 °C) (Temporal)   Resp 16   Ht 5' 4\" (1.626 m)   Wt 165 lb (74.8 kg)   LMP 12/23/2021   SpO2 99%   BMI 28.32 kg/m²   Fetal heart rate:  Baseline Heart Rate present  Plan:   Intrauterine pregnancy at 14 weeks  Hydronephrosis with history of kidney stone   Pain control    Urology involvement appreciated   Antibiotics started  Hypothyroid on Synthroid    Jessica Pelaez MD

## 2022-04-01 ENCOUNTER — ANCILLARY PROCEDURE (OUTPATIENT)
Dept: OBGYN CLINIC | Age: 32
DRG: 831 | End: 2022-04-01
Payer: COMMERCIAL

## 2022-04-01 LAB
ACINETOBACTER CALCOAC BAUMANNII COMPLEX BY PCR: NOT DETECTED
ALBUMIN SERPL-MCNC: 3.4 G/DL (ref 3.5–5.2)
ALP BLD-CCNC: 34 U/L (ref 35–104)
ALT SERPL-CCNC: 13 U/L (ref 0–32)
ANION GAP SERPL CALCULATED.3IONS-SCNC: 10 MMOL/L (ref 7–16)
AST SERPL-CCNC: 16 U/L (ref 0–31)
BACTEROIDES FRAGILIS BY PCR: NOT DETECTED
BASOPHILS ABSOLUTE: 0.03 E9/L (ref 0–0.2)
BASOPHILS RELATIVE PERCENT: 0.3 % (ref 0–2)
BILIRUB SERPL-MCNC: 0.3 MG/DL (ref 0–1.2)
BOTTLE TYPE: ABNORMAL
BUN BLDV-MCNC: 4 MG/DL (ref 6–20)
CALCIUM SERPL-MCNC: 8.3 MG/DL (ref 8.6–10.2)
CANDIDA ALBICANS BY PCR: NOT DETECTED
CANDIDA AURIS BY PCR: NOT DETECTED
CANDIDA GLABRATA BY PCR: NOT DETECTED
CANDIDA KRUSEI BY PCR: NOT DETECTED
CANDIDA PARAPSILOSIS BY PCR: NOT DETECTED
CANDIDA TROPICALIS BY PCR: NOT DETECTED
CARBAPENEM RESISTANCE IMP GENE BY PCR: NOT DETECTED
CARBAPENEM RESISTANCE KPC BY PCR: NOT DETECTED
CARBAPENEM RESISTANCE NDM GENE BY PCR: NOT DETECTED
CARBAPENEM RESISTANCE OXA-48 GENE BY PCR: NOT DETECTED
CARBAPENEM RESISTANCE VIM GENE BY PCR: NOT DETECTED
CEPHALOSPORIN RESISTANCE CTX-M GENE BY PCR: NOT DETECTED
CHLORIDE BLD-SCNC: 107 MMOL/L (ref 98–107)
CO2: 19 MMOL/L (ref 22–29)
COLISTIN RESISTANCE MCR-1 GENE BY PCR: NOT DETECTED
CREAT SERPL-MCNC: 0.5 MG/DL (ref 0.5–1)
CRYPTOCOCCUS NEOFORMANS/GATTII BY PCR: NOT DETECTED
ENTEROBACTER CLOACAE COMPLEX BY PCR: NOT DETECTED
ENTEROBACTERALES BY PCR: DETECTED
ENTEROCOCCUS FAECALIS BY PCR: NOT DETECTED
ENTEROCOCCUS FAECIUM BY PCR: NOT DETECTED
EOSINOPHILS ABSOLUTE: 0.05 E9/L (ref 0.05–0.5)
EOSINOPHILS RELATIVE PERCENT: 0.5 % (ref 0–6)
ESCHERICHIA COLI BY PCR: DETECTED
GFR AFRICAN AMERICAN: >60
GFR NON-AFRICAN AMERICAN: >60 ML/MIN/1.73
GLUCOSE BLD-MCNC: 87 MG/DL (ref 74–99)
HAEMOPHILUS INFLUENZAE BY PCR: NOT DETECTED
HCT VFR BLD CALC: 31 % (ref 34–48)
HEMOGLOBIN: 10.4 G/DL (ref 11.5–15.5)
IMMATURE GRANULOCYTES #: 0.08 E9/L
IMMATURE GRANULOCYTES %: 0.8 % (ref 0–5)
KLEBSIELLA AEROGENES BY PCR: NOT DETECTED
KLEBSIELLA OXYTOCA BY PCR: NOT DETECTED
KLEBSIELLA PNEUMONIAE GROUP BY PCR: NOT DETECTED
LISTERIA MONOCYTOGENES BY PCR: NOT DETECTED
LYMPHOCYTES ABSOLUTE: 0.88 E9/L (ref 1.5–4)
LYMPHOCYTES RELATIVE PERCENT: 8.8 % (ref 20–42)
MAGNESIUM: 1.8 MG/DL (ref 1.6–2.6)
MCH RBC QN AUTO: 31.9 PG (ref 26–35)
MCHC RBC AUTO-ENTMCNC: 33.5 % (ref 32–34.5)
MCV RBC AUTO: 95.1 FL (ref 80–99.9)
MONOCYTES ABSOLUTE: 0.83 E9/L (ref 0.1–0.95)
MONOCYTES RELATIVE PERCENT: 8.3 % (ref 2–12)
NEISSERIA MENINGITIDIS BY PCR: NOT DETECTED
NEUTROPHILS ABSOLUTE: 8.13 E9/L (ref 1.8–7.3)
NEUTROPHILS RELATIVE PERCENT: 81.3 % (ref 43–80)
ORDER NUMBER: ABNORMAL
PDW BLD-RTO: 13 FL (ref 11.5–15)
PLATELET # BLD: 193 E9/L (ref 130–450)
PMV BLD AUTO: 10.6 FL (ref 7–12)
POTASSIUM REFLEX MAGNESIUM: 3.4 MMOL/L (ref 3.5–5)
PROTEUS SPECIES BY PCR: NOT DETECTED
PSEUDOMONAS AERUGINOSA BY PCR: NOT DETECTED
RBC # BLD: 3.26 E12/L (ref 3.5–5.5)
SALMONELLA SPECIES BY PCR: NOT DETECTED
SERRATIA MARCESCENS BY PCR: NOT DETECTED
SODIUM BLD-SCNC: 136 MMOL/L (ref 132–146)
SOURCE OF BLOOD CULTURE: ABNORMAL
STAPHYLOCOCCUS AUREUS BY PCR: NOT DETECTED
STAPHYLOCOCCUS EPIDERMIDIS BY PCR: NOT DETECTED
STAPHYLOCOCCUS LUGDUNENSIS BY PCR: NOT DETECTED
STAPHYLOCOCCUS SPECIES BY PCR: NOT DETECTED
STENOTROPHOMONAS MALTOPHILIA BY PCR: NOT DETECTED
STREPTOCOCCUS AGALACTIAE BY PCR: NOT DETECTED
STREPTOCOCCUS PNEUMONIAE BY PCR: NOT DETECTED
STREPTOCOCCUS PYOGENES  BY PCR: NOT DETECTED
STREPTOCOCCUS SPECIES BY PCR: NOT DETECTED
TOTAL PROTEIN: 5.4 G/DL (ref 6.4–8.3)
WBC # BLD: 10 E9/L (ref 4.5–11.5)

## 2022-04-01 PROCEDURE — 6370000000 HC RX 637 (ALT 250 FOR IP): Performed by: NURSE PRACTITIONER

## 2022-04-01 PROCEDURE — 6360000002 HC RX W HCPCS: Performed by: NURSE PRACTITIONER

## 2022-04-01 PROCEDURE — 6370000000 HC RX 637 (ALT 250 FOR IP): Performed by: INTERNAL MEDICINE

## 2022-04-01 PROCEDURE — 2580000003 HC RX 258: Performed by: NURSE PRACTITIONER

## 2022-04-01 PROCEDURE — APPSS30 APP SPLIT SHARED TIME 16-30 MINUTES: Performed by: NURSE PRACTITIONER

## 2022-04-01 PROCEDURE — 99233 SBSQ HOSP IP/OBS HIGH 50: CPT | Performed by: INTERNAL MEDICINE

## 2022-04-01 PROCEDURE — 83735 ASSAY OF MAGNESIUM: CPT

## 2022-04-01 PROCEDURE — 87040 BLOOD CULTURE FOR BACTERIA: CPT

## 2022-04-01 PROCEDURE — 85025 COMPLETE CBC W/AUTO DIFF WBC: CPT

## 2022-04-01 PROCEDURE — 2060000000 HC ICU INTERMEDIATE R&B

## 2022-04-01 PROCEDURE — 80053 COMPREHEN METABOLIC PANEL: CPT

## 2022-04-01 PROCEDURE — 76805 OB US >/= 14 WKS SNGL FETUS: CPT | Performed by: OBSTETRICS & GYNECOLOGY

## 2022-04-01 PROCEDURE — 36415 COLL VENOUS BLD VENIPUNCTURE: CPT

## 2022-04-01 RX ORDER — POTASSIUM CHLORIDE 20 MEQ/1
40 TABLET, EXTENDED RELEASE ORAL EVERY 6 HOURS
Status: COMPLETED | OUTPATIENT
Start: 2022-04-01 | End: 2022-04-01

## 2022-04-01 RX ORDER — ASPIRIN 81 MG/1
81 TABLET, CHEWABLE ORAL NIGHTLY
Status: DISCONTINUED | OUTPATIENT
Start: 2022-04-01 | End: 2022-04-03 | Stop reason: HOSPADM

## 2022-04-01 RX ADMIN — POTASSIUM CHLORIDE 40 MEQ: 20 TABLET, EXTENDED RELEASE ORAL at 15:22

## 2022-04-01 RX ADMIN — ACETAMINOPHEN 650 MG: 325 TABLET ORAL at 00:41

## 2022-04-01 RX ADMIN — Medication 10 ML: at 20:52

## 2022-04-01 RX ADMIN — ACETAMINOPHEN 650 MG: 325 TABLET ORAL at 16:28

## 2022-04-01 RX ADMIN — LEVOTHYROXINE SODIUM 25 MCG: 25 TABLET ORAL at 05:04

## 2022-04-01 RX ADMIN — ASPIRIN 81 MG: 81 TABLET, CHEWABLE ORAL at 20:52

## 2022-04-01 RX ADMIN — SODIUM CHLORIDE, PRESERVATIVE FREE 1000 MG: 5 INJECTION INTRAVENOUS at 00:30

## 2022-04-01 RX ADMIN — SODIUM CHLORIDE, PRESERVATIVE FREE 10 ML: 5 INJECTION INTRAVENOUS at 00:32

## 2022-04-01 RX ADMIN — ACETAMINOPHEN 650 MG: 325 TABLET ORAL at 10:25

## 2022-04-01 RX ADMIN — METFORMIN HYDROCHLORIDE 1 TABLET: 500 TABLET, EXTENDED RELEASE ORAL at 08:45

## 2022-04-01 RX ADMIN — SODIUM CHLORIDE 125 ML/HR: 9 INJECTION, SOLUTION INTRAVENOUS at 16:28

## 2022-04-01 RX ADMIN — SODIUM CHLORIDE: 9 INJECTION, SOLUTION INTRAVENOUS at 05:03

## 2022-04-01 RX ADMIN — POTASSIUM CHLORIDE 40 MEQ: 20 TABLET, EXTENDED RELEASE ORAL at 10:18

## 2022-04-01 ASSESSMENT — PAIN DESCRIPTION - DESCRIPTORS
DESCRIPTORS: ACHING
DESCRIPTORS: SORE
DESCRIPTORS: SORE

## 2022-04-01 ASSESSMENT — PAIN SCALES - GENERAL
PAINLEVEL_OUTOF10: 0
PAINLEVEL_OUTOF10: 2
PAINLEVEL_OUTOF10: 0
PAINLEVEL_OUTOF10: 6
PAINLEVEL_OUTOF10: 0
PAINLEVEL_OUTOF10: 0

## 2022-04-01 ASSESSMENT — PAIN DESCRIPTION - PAIN TYPE
TYPE: ACUTE PAIN
TYPE: ACUTE PAIN

## 2022-04-01 ASSESSMENT — PAIN DESCRIPTION - FREQUENCY
FREQUENCY: INTERMITTENT
FREQUENCY: INTERMITTENT

## 2022-04-01 ASSESSMENT — PAIN DESCRIPTION - LOCATION
LOCATION: GENERALIZED
LOCATION: HEAD

## 2022-04-01 ASSESSMENT — PAIN DESCRIPTION - ONSET: ONSET: UNABLE TO TELL

## 2022-04-01 ASSESSMENT — PAIN DESCRIPTION - ORIENTATION: ORIENTATION: RIGHT

## 2022-04-01 NOTE — PROGRESS NOTES
OB Progress Note    SUBJECTIVE: Patient much improved today    OBJECTIVE:    VITALS:  /60   Pulse 104   Temp 99.8 °F (37.7 °C) (Oral)   Resp 20   Ht 5' 4\" (1.626 m)   Wt 173 lb 8 oz (78.7 kg)   LMP 12/23/2021   SpO2 98%   BMI 29.78 kg/m²   Physical Exam      DATA:  CBC:   Lab Results   Component Value Date    WBC 10.0 04/01/2022    RBC 3.26 04/01/2022    HGB 10.4 04/01/2022    HCT 31.0 04/01/2022    MCV 95.1 04/01/2022    MCH 31.9 04/01/2022    MCHC 33.5 04/01/2022    RDW 13.0 04/01/2022     04/01/2022    MPV 10.6 04/01/2022       ASSESSMENT AND PLAN  Pyelonephritis, hydronephrosis   Patient improving on antibiotics   Will require antibiotic prophylaxis for the remainder of pregnanCY   Consider possible kidney stone as patient has a history but no   evidence currently  Hypothyroid on Synthroid  Disposition per urology may follow-up in the office after discharge        Vijaya Medina MD 4/1/2022 2:02 PM

## 2022-04-01 NOTE — PROGRESS NOTES
[facility] PROGRESS NOTE                                                                       4/1/2022        CHIEF UROLOGIC COMPLAINT: Pyelonephritis     HISTORY OF PRESENT ILLNESS:  Patient without new complaints. Improved pain and feels better compared to yesterday. Still pain, fevers, tachycardia. REVIEW OF SYSTEMS:   CONSTITUTIONAL: as above  HEENT: negative  HEMATOLOGIC: negative  ENDOCRINE: negative  RESPIRATORY: negative  CV: negative  GI: negative  NEURO: negative  ORTHOPEDICS: negative  PSYCHIATRIC: negative  : as above    PAST FAMILY HISTORY:  History reviewed. No pertinent family history. PAST SOCIAL HISTORY:    Social History     Socioeconomic History    Marital status:      Spouse name: None    Number of children: None    Years of education: None    Highest education level: None   Occupational History    None   Tobacco Use    Smoking status: Never Smoker    Smokeless tobacco: Never Used   Vaping Use    Vaping Use: Never used   Substance and Sexual Activity    Alcohol use: Yes    Drug use: No    Sexual activity: Not Currently     Partners: Male   Other Topics Concern    None   Social History Narrative    None     Social Determinants of Health     Financial Resource Strain:     Difficulty of Paying Living Expenses: Not on file   Food Insecurity:     Worried About Running Out of Food in the Last Year: Not on file    David of Food in the Last Year: Not on file   Transportation Needs:     Lack of Transportation (Medical): Not on file    Lack of Transportation (Non-Medical):  Not on file   Physical Activity:     Days of Exercise per Week: Not on file    Minutes of Exercise per Session: Not on file   Stress:     Feeling of Stress : Not on file   Social Connections:     Frequency of Communication with Friends and Family: Not on file    Frequency of Social Gatherings with Friends and Family: Not on file    Attends Muslim Services: Not on file    Active Member of Clubs or Organizations: Not on file    Attends Club or Organization Meetings: Not on file    Marital Status: Not on file   Intimate Partner Violence:     Fear of Current or Ex-Partner: Not on file    Emotionally Abused: Not on file    Physically Abused: Not on file    Sexually Abused: Not on file   Housing Stability:     Unable to Pay for Housing in the Last Year: Not on file    Number of [place] in the Last Year: Not on file    Unstable Housing in the Last Year: Not on file       Scheduled Meds:   HYDROmorphone  0.5 mg IntraVENous Once    sodium chloride flush  5-40 mL IntraVENous 2 times per day    prenatal vitamin  1 tablet Oral Daily    levothyroxine  25 mcg Oral Daily    cefTRIAXone (ROCEPHIN) IV  1,000 mg IntraVENous Q24H     Continuous Infusions:   sodium chloride      sodium chloride 125 mL/hr at 04/01/22 0503     PRN Meds:.sodium chloride flush, sodium chloride, ondansetron **OR** ondansetron, acetaminophen **OR** acetaminophen, polyethylene glycol, HYDROmorphone    /69   Pulse 116   Temp 100.2 °F (37.9 °C) (Oral)   Resp 16   Ht 5' 4\" (1.626 m)   Wt 173 lb 8 oz (78.7 kg)   LMP 12/23/2021   SpO2 99%   BMI 29.78 kg/m²     Lab Results   Component Value Date    WBC 10.0 04/01/2022    HGB 10.4 (L) 04/01/2022    HCT 31.0 (L) 04/01/2022    MCV 95.1 04/01/2022     04/01/2022       Lab Results   Component Value Date    CREATININE 0.5 04/01/2022       No results found for: PSA    No results found for: LABURIN    No results found for: BC    No results found for: BLOODCULT2    PHYSICAL EXAMINATION:  Skin dry, without rashes  Respirations non-labored, intact  Alert and oriented x3    ASSESSMENT AND PLAN:  1. Right hydronephrosis of pregnancy with acute pyelonephritis. Discussed with [facility] the limitations of diagnostic modalities in lieu of her pregnancy. As she appears to be slowly improving clinically will continue to monitor for changes. If she were to become hemodynamically instable we discussed ureteral stenting only at that time.       Kayla Ayala MD, MGABRIEL.  4/1/2022  7:10 AM

## 2022-04-01 NOTE — PROGRESS NOTES
[facility] Progress Note    Admitting Date and Time: 3/30/2022 10:28 PM  Admit Dx: Hydronephrosis [N13.30]  Hypokalemia [E87.6]  Septicemia ([address] [A41.9]  Flank pain [R10.9]  Acute cystitis without hematuria [N30.00]  Sepsis ([address] [A41.9]      Assessment:    Principal Problem:    Septicemia ([address]  Active Problems:    Hydronephrosis    Hypokalemia    Hyponatremia    Acidosis  Resolved Problems:    * No resolved hospital problems. *      Plan:  1. Right hydronephrosis, pyelonephritis  - presented with gradual onset right flank pain with acute worsening day of admission. Hx of renal calculi years ago, similar discomfort. Noted malodorous urine and incontinence - but not dysuria  - renal sonogram right hydronephrosis - no obvious calculi reported intrarenally. - UA with cloudy appearance, LE +ve and some bacteria (in setting of pregnancy)  Currently on ceftriaxone, awaiting urine culture. Urology consulted, no interventions planned  Continue IV opiate pain control and PRN zofran  IV hydration     2. 14 weeks gestation  - OB consulted. - continue vitamin supplements     3. Hypothyroid  - levothyroxine    4. Hypokalemia  - supplementing PO    5. E-coli Bacteremia  - all blood cultures positive, E-Coli  Felt due to UTI/pyelonephritis  She is rapidly improving on Ceftriaxone, change to 2 gram IV daily  Repeat blood cultures to ensure clearance  Will need 14 day course Abx    Subjective:  Patient is being followed for Hydronephrosis [N13.30]  Hypokalemia [E87.6]  Septicemia ([address] [A41.9]  Flank pain [R10.9]  Acute cystitis without hematuria [N30.00]  Sepsis ([address] [A41.9]     Low grade temp overnight 100.2  Remains slightly tachycardic, overall pain improved. Tolerating PO. She feels markedly better. ROS: denies chills, cp, sob, n/v, HA unless stated above.       potassium chloride  40 mEq Oral Q6H    HYDROmorphone  0.5 mg IntraVENous Once    sodium chloride flush  5-40 mL IntraVENous 2 times per day    prenatal vitamin  1 tablet Oral Daily    levothyroxine  25 mcg Oral Daily    cefTRIAXone (ROCEPHIN) IV  1,000 mg IntraVENous Q24H     sodium chloride flush, 5-40 mL, PRN  sodium chloride, , PRN  ondansetron, 4 mg, Q8H PRN   Or  ondansetron, 4 mg, Q6H PRN  acetaminophen, 650 mg, Q6H PRN   Or  acetaminophen, 650 mg, Q6H PRN  polyethylene glycol, 17 g, Daily PRN  HYDROmorphone, 1 mg, Q2H PRN         Objective:    /69   Pulse 116   Temp 100.2 °F (37.9 °C) (Oral)   Resp 16   Ht 5' 4\" (1.626 m)   Wt 173 lb 8 oz (78.7 kg)   LMP 12/23/2021   SpO2 99%   BMI 29.78 kg/m²     General Appearance: alert and oriented to person, place and time and in no acute distress  Skin: warm and dry  Head: normocephalic and atraumatic  Eyes: pupils equal, round, and reactive to light, extraocular eye movements intact, conjunctivae normal  Neck: neck supple and non tender without mass   Pulmonary/Chest: clear to auscultation bilaterally- no wheezes, rales or rhonchi, normal air movement, no respiratory distress  Cardiovascular: normal rate, normal S1 and S2 and no carotid bruits  Abdomen: soft, non-tender, non-distended, normal bowel sounds, no masses or organomegaly  CVA tenderness improved  Extremities: no cyanosis, no clubbing and no edema  Neurologic: no cranial nerve deficit and speech normal        Recent Labs     03/30/22 2237 03/31/22  0644 04/01/22  0500   * 135 136   K 3.3* 3.6 3.4*   CL 99 106 107   CO2 20* 18* 19*   BUN 6 4* 4*   CREATININE 0.6 0.5 0.5   GLUCOSE 112* 112* 87   CALCIUM 9.0 8.0* 8.3*       Recent Labs     03/30/22 2237 03/31/22 0644 04/01/22  0500   WBC 21.2* 19.2* 10.0   RBC 3.70 3.35* 3.26*   HGB 12.2 10.9* 10.4*   HCT 34.6 31.6* 31.0*   MCV 93.5 94.3 95.1   MCH 33.0 32.5 31.9   MCHC 35.3* 34.5 33.5   RDW 12.2 12.3 13.0    202 193   MPV 10.7 10.8 10.6       Radiology:     NOTE: This report was transcribed using voice recognition software.  Every effort was made to ensure accuracy; however, inadvertent computerized transcription errors may be present.   Electronically signed by LUKE Pappas CNP on 4/1/2022 at 8:48 AM

## 2022-04-01 NOTE — PROGRESS NOTES
P Quality Flow/Interdisciplinary Rounds Progress Note        Quality Flow Rounds held on April 1, 2022    Disciplines Attending:  Bedside Nurse, ,  and Nursing Unit 6[address] Prashant Jeong was admitted on 3/30/2022 10:28 PM    Anticipated Discharge Date:  Expected Discharge Date: 04/03/22    Disposition:    Behzad Score:  Behzad Scale Score: 21    Readmission Risk              Risk of Unplanned Readmission:  7           Discussed patient goal for the day, patient clinical progression, and barriers to discharge. The following Goal(s) of the Day/Commitment(s) have been identified:  Continue IV hydration, monitor kidney function, pain mgmt.       Costa Garnica RN  April 1, 2022

## 2022-04-01 NOTE — CONSULTS
[facility] Initial Review Note                                                                                          2022    Asked to see Yasmani Durant  - on [address] for   Flank Pain, UTI, Hematuria. ^^WBCs . Hx of Renal Stones , AMA     26 yo    at  14w 3d  EGA    [facility] 22       Notes reviewed -   VSS -  currently Afebrile   P102   /62   NO VB No PPROM  Pain addressed w/ dilaudid, APAP    US/ R hydronephrosis, no currently visible stone   Urology following - no stent for now  Rx Rocephin IV - appropriate empiric pregnant pyelo coverage  ( Responding well)     OB US done . - see report     1. Single living intrauterine pregnancy with biometry consistent with clinical dates. ( 14w3d)   2. Anatomic survey performed as noted above. Anatomy was suboptimal due to fetal position and early gestational age. 3. Amniotic fluid appeared normal.  4. Placenta is posterior Grade 0 without evidence of previa. No signs to suggest bleeding, abruption, or disruption. 5. Fetus is active, well formed, shows no apparent anomalies at this early exam.     6. No signs to suggest SROM, chorioamnionitis or fetal compromise. Impression       No Active Labor  Size = Dates    No Distress by US/        - Labs --Uro sepsis w/ pos blood cultures gram neg rods   CBC Resolving     Impression - Stable from OB standpoint     Recommendation -   Would recommend followup with her OBG - discuss prenatal testing , Anatomy scan 18-20 w, possible fetal echo if indicated  due to her advanced maternal age. If she is still admitted will examine tomorrow if no further improvement.   Hydration/ stone/ UTI precautions      Kaushik Pérez MD

## 2022-04-01 NOTE — PROGRESS NOTES
Consult for Intrauterine pregnancy at 14 weeks with hydronephrosis and pyelonephritis possible kidney stone with prior history called to Dr. Maximo Loja.

## 2022-04-02 LAB
ALBUMIN SERPL-MCNC: 3.4 G/DL (ref 3.5–5.2)
ALP BLD-CCNC: 37 U/L (ref 35–104)
ALT SERPL-CCNC: 14 U/L (ref 0–32)
ANION GAP SERPL CALCULATED.3IONS-SCNC: 10 MMOL/L (ref 7–16)
AST SERPL-CCNC: 17 U/L (ref 0–31)
BASOPHILS ABSOLUTE: 0.04 E9/L (ref 0–0.2)
BASOPHILS RELATIVE PERCENT: 0.3 % (ref 0–2)
BILIRUB SERPL-MCNC: <0.2 MG/DL (ref 0–1.2)
BUN BLDV-MCNC: 5 MG/DL (ref 6–20)
CALCIUM SERPL-MCNC: 8.2 MG/DL (ref 8.6–10.2)
CHLORIDE BLD-SCNC: 106 MMOL/L (ref 98–107)
CO2: 20 MMOL/L (ref 22–29)
CREAT SERPL-MCNC: 0.5 MG/DL (ref 0.5–1)
EOSINOPHILS ABSOLUTE: 0.26 E9/L (ref 0.05–0.5)
EOSINOPHILS RELATIVE PERCENT: 2.3 % (ref 0–6)
GFR AFRICAN AMERICAN: >60
GFR NON-AFRICAN AMERICAN: >60 ML/MIN/1.73
GLUCOSE BLD-MCNC: 87 MG/DL (ref 74–99)
HCT VFR BLD CALC: 30.4 % (ref 34–48)
HEMOGLOBIN: 10.2 G/DL (ref 11.5–15.5)
IMMATURE GRANULOCYTES #: 0.05 E9/L
IMMATURE GRANULOCYTES %: 0.4 % (ref 0–5)
LYMPHOCYTES ABSOLUTE: 1.29 E9/L (ref 1.5–4)
LYMPHOCYTES RELATIVE PERCENT: 11.2 % (ref 20–42)
MCH RBC QN AUTO: 31.7 PG (ref 26–35)
MCHC RBC AUTO-ENTMCNC: 33.6 % (ref 32–34.5)
MCV RBC AUTO: 94.4 FL (ref 80–99.9)
MONOCYTES ABSOLUTE: 1.38 E9/L (ref 0.1–0.95)
MONOCYTES RELATIVE PERCENT: 12 % (ref 2–12)
NEUTROPHILS ABSOLUTE: 8.5 E9/L (ref 1.8–7.3)
NEUTROPHILS RELATIVE PERCENT: 73.8 % (ref 43–80)
ORGANISM: ABNORMAL
PDW BLD-RTO: 12.7 FL (ref 11.5–15)
PLATELET # BLD: 200 E9/L (ref 130–450)
PMV BLD AUTO: 10.1 FL (ref 7–12)
POTASSIUM REFLEX MAGNESIUM: 3.8 MMOL/L (ref 3.5–5)
RBC # BLD: 3.22 E12/L (ref 3.5–5.5)
SODIUM BLD-SCNC: 136 MMOL/L (ref 132–146)
TOTAL PROTEIN: 5.5 G/DL (ref 6.4–8.3)
URINE CULTURE, ROUTINE: ABNORMAL
WBC # BLD: 11.5 E9/L (ref 4.5–11.5)

## 2022-04-02 PROCEDURE — 2580000003 HC RX 258: Performed by: NURSE PRACTITIONER

## 2022-04-02 PROCEDURE — APPSS30 APP SPLIT SHARED TIME 16-30 MINUTES: Performed by: NURSE PRACTITIONER

## 2022-04-02 PROCEDURE — 99233 SBSQ HOSP IP/OBS HIGH 50: CPT | Performed by: INTERNAL MEDICINE

## 2022-04-02 PROCEDURE — 6370000000 HC RX 637 (ALT 250 FOR IP): Performed by: NURSE PRACTITIONER

## 2022-04-02 PROCEDURE — 85025 COMPLETE CBC W/AUTO DIFF WBC: CPT

## 2022-04-02 PROCEDURE — 80053 COMPREHEN METABOLIC PANEL: CPT

## 2022-04-02 PROCEDURE — 36415 COLL VENOUS BLD VENIPUNCTURE: CPT

## 2022-04-02 PROCEDURE — 6360000002 HC RX W HCPCS: Performed by: NURSE PRACTITIONER

## 2022-04-02 PROCEDURE — 2060000000 HC ICU INTERMEDIATE R&B

## 2022-04-02 PROCEDURE — 6370000000 HC RX 637 (ALT 250 FOR IP): Performed by: INTERNAL MEDICINE

## 2022-04-02 RX ORDER — DOCUSATE SODIUM 100 MG/1
100 CAPSULE, LIQUID FILLED ORAL 2 TIMES DAILY
Status: DISCONTINUED | OUTPATIENT
Start: 2022-04-02 | End: 2022-04-03 | Stop reason: HOSPADM

## 2022-04-02 RX ORDER — ERYTHROMYCIN 5 MG/G
OINTMENT OPHTHALMIC NIGHTLY
Status: DISCONTINUED | OUTPATIENT
Start: 2022-04-02 | End: 2022-04-03 | Stop reason: HOSPADM

## 2022-04-02 RX ADMIN — SODIUM CHLORIDE: 9 INJECTION, SOLUTION INTRAVENOUS at 01:14

## 2022-04-02 RX ADMIN — WATER 2000 MG: 1 INJECTION INTRAMUSCULAR; INTRAVENOUS; SUBCUTANEOUS at 01:16

## 2022-04-02 RX ADMIN — DOCUSATE SODIUM 100 MG: 100 CAPSULE, LIQUID FILLED ORAL at 11:19

## 2022-04-02 RX ADMIN — ACETAMINOPHEN 650 MG: 325 TABLET ORAL at 16:32

## 2022-04-02 RX ADMIN — ASPIRIN 81 MG: 81 TABLET, CHEWABLE ORAL at 21:26

## 2022-04-02 RX ADMIN — Medication 10 ML: at 11:30

## 2022-04-02 RX ADMIN — LEVOTHYROXINE SODIUM 25 MCG: 25 TABLET ORAL at 06:12

## 2022-04-02 RX ADMIN — ERYTHROMYCIN: 5 OINTMENT OPHTHALMIC at 21:27

## 2022-04-02 RX ADMIN — DOCUSATE SODIUM 100 MG: 100 CAPSULE, LIQUID FILLED ORAL at 21:26

## 2022-04-02 RX ADMIN — ACETAMINOPHEN 650 MG: 325 TABLET ORAL at 06:12

## 2022-04-02 RX ADMIN — METFORMIN HYDROCHLORIDE 1 TABLET: 500 TABLET, EXTENDED RELEASE ORAL at 09:29

## 2022-04-02 ASSESSMENT — PAIN SCALES - GENERAL
PAINLEVEL_OUTOF10: 6
PAINLEVEL_OUTOF10: 4
PAINLEVEL_OUTOF10: 2

## 2022-04-02 ASSESSMENT — PAIN DESCRIPTION - LOCATION: LOCATION: HEAD

## 2022-04-02 ASSESSMENT — PAIN DESCRIPTION - DESCRIPTORS: DESCRIPTORS: ACHING;DULL

## 2022-04-02 ASSESSMENT — PAIN DESCRIPTION - PAIN TYPE: TYPE: ACUTE PAIN

## 2022-04-02 NOTE — PROGRESS NOTES
[facility] Progress Note    Admitting Date and Time: 3/30/2022 10:28 PM  Admit Dx: Hydronephrosis [N13.30]  Hypokalemia [E87.6]  Septicemia ([address] [A41.9]  Flank pain [R10.9]  Acute cystitis without hematuria [N30.00]  Sepsis ([address] [A41.9]      Assessment:    Principal Problem:    Septicemia ([address]  Active Problems:    Hydronephrosis    Hypokalemia    Hyponatremia    Acidosis  Resolved Problems:    * No resolved hospital problems. *      Plan:  1. Right hydronephrosis, pyelonephritis  - presented with gradual onset right flank pain with acute worsening day of admission. Hx of renal calculi years ago, similar discomfort. Noted malodorous urine and incontinence - but not dysuria  - renal sonogram right hydronephrosis - no obvious calculi reported intrarenally. - UA with cloudy appearance, LE +ve and some bacteria (in setting of pregnancy)  Currently on ceftriaxone, Culture is E-Coli. Changed to 2 grams dailyu  Urology consulted, no interventions planned  Continue IV opiate pain control and PRN zofran     2. 14 weeks gestation  - OB consulted. - continue vitamin supplements     3. Hypothyroid  - levothyroxine    4. Hypokalemia  - supplementing PO    5. E-coli Bacteremia  - all blood cultures positive, E-Coli  Felt due to UTI/pyelonephritis  She is rapidly improving on Ceftriaxone, changed  to 2 gram IV daily  Repeat blood cultures to ensure clearance, drawn 4/1 ->pending  Will need 14 day course Abx    Subjective:  Patient is being followed for Hydronephrosis [N13.30]  Hypokalemia [E87.6]  Septicemia ([address] [A41.9]  Flank pain [R10.9]  Acute cystitis without hematuria [N30.00]  Sepsis ([address] [A41.9]     Afebrile overnight, HR improved. Pain improved. Denies dysuria. Awaiting follow up blood cultures    ROS: denies chills, cp, sob, n/v, HA unless stated above.       cefTRIAXone (ROCEPHIN) IV  2,000 mg IntraVENous Q24H    aspirin  81 mg Oral Nightly    HYDROmorphone  0.5 mg IntraVENous Once    sodium chloride flush  5-40 mL IntraVENous 2 times per day    prenatal vitamin  1 tablet Oral Daily    levothyroxine  25 mcg Oral Daily     sodium chloride flush, 5-40 mL, PRN  sodium chloride, , PRN  ondansetron, 4 mg, Q8H PRN   Or  ondansetron, 4 mg, Q6H PRN  acetaminophen, 650 mg, Q6H PRN   Or  acetaminophen, 650 mg, Q6H PRN  polyethylene glycol, 17 g, Daily PRN  HYDROmorphone, 1 mg, Q2H PRN         Objective:    /81   Pulse 99   Temp 98.8 °F (37.1 °C) (Oral)   Resp 19   Ht 5' 4\" (1.626 m)   Wt 173 lb 8 oz (78.7 kg)   LMP 12/23/2021   SpO2 99%   BMI 29.78 kg/m²     General Appearance: alert and oriented to person, place and time and in no acute distress  Skin: warm and dry  Head: normocephalic and atraumatic  Eyes: pupils equal, round, and reactive to light, extraocular eye movements intact, conjunctivae normal  Neck: neck supple and non tender without mass   Pulmonary/Chest: clear to auscultation bilaterally- no wheezes, rales or rhonchi, normal air movement, no respiratory distress  Cardiovascular: normal rate, normal S1 and S2 and no carotid bruits  Abdomen: soft, non-tender, non-distended, normal bowel sounds, no masses or organomegaly  CVA tenderness improved  Extremities: no cyanosis, no clubbing and no edema  Neurologic: no cranial nerve deficit and speech normal        Recent Labs     03/31/22  0644 04/01/22  0500 04/02/22  0351    136 136   K 3.6 3.4* 3.8    107 106   CO2 18* 19* 20*   BUN 4* 4* 5*   CREATININE 0.5 0.5 0.5   GLUCOSE 112* 87 87   CALCIUM 8.0* 8.3* 8.2*       Recent Labs     03/31/22  0644 04/01/22  0500 04/02/22  0351   WBC 19.2* 10.0 11.5   RBC 3.35* 3.26* 3.22*   HGB 10.9* 10.4* 10.2*   HCT 31.6* 31.0* 30.4*   MCV 94.3 95.1 94.4   MCH 32.5 31.9 31.7   MCHC 34.5 33.5 33.6   RDW 12.3 13.0 12.7    193 200   MPV 10.8 10.6 10.1       Radiology:     NOTE: This report was transcribed using voice recognition software.  Every effort was made to ensure accuracy; however, inadvertent computerized transcription errors may be present.   Electronically signed by LUKE Alvarez CNP on 4/2/2022 at 7:22 AM

## 2022-04-02 NOTE — PROGRESS NOTES
[facility]  PROGRESS NOTE    Chief Complaint:   Right hydronephrosis of pregnancy/Acute right pyelonephritis/UTI    HPI:   Her pain is controlled. She has discomfort in her low back bilaterally. She is voiding comfortably. She denies hematuria or dysuria. She hopes to go home today    Vitals:    04/02/22 0740   BP: 107/63   Pulse: 91   Resp: 18   Temp: 98.4 °F (36.9 °C)   SpO2: 99%       Allergies: Patient has no known allergies. PAST MEDICAL HISTORY:   Past Medical History:   Diagnosis Date    Abnormal Pap smear of cervix 2013    HPV once; normal since    HPV (human papilloma virus) anogenital infection 2013    Kidney stone 2005       PAST SURGICAL HISTORY:   Past Surgical History:   Procedure Laterality Date    COLPOSCOPY  2013    TYMPANOSTOMY TUBE PLACEMENT  1991        PAST FAMILY HISTORY:  History reviewed. No pertinent family history. PAST SOCIAL HISTORY:    Social History     Socioeconomic History    Marital status:      Spouse name: None    Number of children: None    Years of education: None    Highest education level: None   Occupational History    None   Tobacco Use    Smoking status: Never Smoker    Smokeless tobacco: Never Used   Vaping Use    Vaping Use: Never used   Substance and Sexual Activity    Alcohol use: Yes    Drug use: No    Sexual activity: Not Currently     Partners: Male   Other Topics Concern    None   Social History Narrative    None     Social Determinants of Health     Financial Resource Strain:     Difficulty of Paying Living Expenses: Not on file   Food Insecurity:     Worried About Running Out of Food in the Last Year: Not on file    David of Food in the Last Year: Not on file   Transportation Needs:     Lack of Transportation (Medical): Not on file    Lack of Transportation (Non-Medical):  Not on file   Physical Activity:     Days of Exercise per Week: Not on file    Minutes of Exercise per Session: Not on file   Stress:     Feeling of Stress : Not on file   Social Connections:     Frequency of Communication with Friends and Family: Not on file    Frequency of Social Gatherings with Friends and Family: Not on file    Attends Sikh Services: Not on file    Active Member of Clubs or Organizations: Not on file    Attends Club or Organization Meetings: Not on file    Marital Status: Not on file   Intimate Partner Violence:     Fear of Current or Ex-Partner: Not on file    Emotionally Abused: Not on file    Physically Abused: Not on file    Sexually Abused: Not on file   Housing Stability:     Unable to Pay for Housing in the Last Year: Not on file    Number of [place] in the Last Year: Not on file    Unstable Housing in the Last Year: Not on file    with 2 children.   [facility]     IV:    sodium chloride      sodium chloride 125 mL/hr at 04/02/22 0114       PRN: sodium chloride flush, sodium chloride, ondansetron **OR** ondansetron, acetaminophen **OR** acetaminophen, polyethylene glycol, HYDROmorphone    Scheduled:    cefTRIAXone (ROCEPHIN) IV  2,000 mg IntraVENous Q24H    aspirin  81 mg Oral Nightly    HYDROmorphone  0.5 mg IntraVENous Once    sodium chloride flush  5-40 mL IntraVENous 2 times per day    prenatal vitamin  1 tablet Oral Daily    levothyroxine  25 mcg Oral Daily       Lab Results   Component Value Date     04/02/2022    K 3.8 04/02/2022    BUN 5 04/02/2022    CREATININE 0.5 04/02/2022        Lab Results   Component Value Date    HGB 10.2 04/02/2022    HCT 30.4 04/02/2022       Review Of Systems:  Constitutional: Tired  Eyes: negative  Ears, nose, mouth, throat, and face: negative  Respiratory: negative  Cardiovascular: negative  Gastrointestinal: negative  Genitourinary: UTIs  Musculoskeletal: negative  Neurological: negative  Behavioral/Psych: negative  Endocrine: negative    Physical Exam:  Skin is dry, and without rashes  Respirations are non-labored, intact  Abdomen is soft, non-tender, non-distended. Active bowel sounds are present. No rebound or guarding  Alert and oriented x 3. No focal motor/sensory deficits    Assessment and Plan:  Right hydronephrosis of pregnancy/Acute right pyelonephritis/UTI  -Urine culture on 3/30/2022 is growing E. coli. Continue antibiotics.   She is apparently to be started on daily antibiotic prophylaxis by Dr. Caitie Anderson which I suspect is a good idea to prevent further hospital admissions for the remainder of her pregnancy  -She was encouraged to increase her oral fluid intake, drink cranberry juice, and wipe front to back after voiding  -No surgical invention or ureteral stenting is planned at this time unless her issues become recurrent and persistent through the remainder of her pregnancy which we discussed in detail  -CT scan imaging of her abdomen to evaluate for potential stone disease to be performed once she is postpartum  -Stable for discharge from urology standpoint    Tanvi Short MD  4/2/2022  7:49 AM

## 2022-04-03 VITALS
WEIGHT: 173.5 LBS | OXYGEN SATURATION: 100 % | HEART RATE: 94 BPM | HEIGHT: 64 IN | RESPIRATION RATE: 17 BRPM | BODY MASS INDEX: 29.62 KG/M2 | TEMPERATURE: 97.9 F | SYSTOLIC BLOOD PRESSURE: 107 MMHG | DIASTOLIC BLOOD PRESSURE: 62 MMHG

## 2022-04-03 LAB
CULTURE, BLOOD 2: ABNORMAL
CULTURE, BLOOD 2: ABNORMAL
HCT VFR BLD CALC: 33.4 % (ref 34–48)
HEMOGLOBIN: 11.3 G/DL (ref 11.5–15.5)
MCH RBC QN AUTO: 32.2 PG (ref 26–35)
MCHC RBC AUTO-ENTMCNC: 33.8 % (ref 32–34.5)
MCV RBC AUTO: 95.2 FL (ref 80–99.9)
ORGANISM: ABNORMAL
ORGANISM: ABNORMAL
PDW BLD-RTO: 12.5 FL (ref 11.5–15)
PLATELET # BLD: 237 E9/L (ref 130–450)
PMV BLD AUTO: 10.5 FL (ref 7–12)
RBC # BLD: 3.51 E12/L (ref 3.5–5.5)
WBC # BLD: 8.7 E9/L (ref 4.5–11.5)

## 2022-04-03 PROCEDURE — 2580000003 HC RX 258: Performed by: NURSE PRACTITIONER

## 2022-04-03 PROCEDURE — 36415 COLL VENOUS BLD VENIPUNCTURE: CPT

## 2022-04-03 PROCEDURE — 99239 HOSP IP/OBS DSCHRG MGMT >30: CPT | Performed by: INTERNAL MEDICINE

## 2022-04-03 PROCEDURE — 85027 COMPLETE CBC AUTOMATED: CPT

## 2022-04-03 PROCEDURE — 6360000002 HC RX W HCPCS: Performed by: NURSE PRACTITIONER

## 2022-04-03 PROCEDURE — APPSS30 APP SPLIT SHARED TIME 16-30 MINUTES: Performed by: NURSE PRACTITIONER

## 2022-04-03 PROCEDURE — 6370000000 HC RX 637 (ALT 250 FOR IP): Performed by: NURSE PRACTITIONER

## 2022-04-03 RX ORDER — CEFDINIR 300 MG/1
300 CAPSULE ORAL 2 TIMES DAILY
Qty: 14 CAPSULE | Refills: 0 | Status: SHIPPED | OUTPATIENT
Start: 2022-04-03 | End: 2022-04-13

## 2022-04-03 RX ADMIN — WATER 2000 MG: 1 INJECTION INTRAMUSCULAR; INTRAVENOUS; SUBCUTANEOUS at 01:04

## 2022-04-03 RX ADMIN — ACETAMINOPHEN 650 MG: 325 TABLET ORAL at 01:38

## 2022-04-03 RX ADMIN — Medication 10 ML: at 08:56

## 2022-04-03 RX ADMIN — LEVOTHYROXINE SODIUM 25 MCG: 25 TABLET ORAL at 05:30

## 2022-04-03 RX ADMIN — ACETAMINOPHEN 650 MG: 325 TABLET ORAL at 14:54

## 2022-04-03 RX ADMIN — METFORMIN HYDROCHLORIDE 1 TABLET: 500 TABLET, EXTENDED RELEASE ORAL at 08:56

## 2022-04-03 ASSESSMENT — PAIN SCALES - GENERAL
PAINLEVEL_OUTOF10: 4
PAINLEVEL_OUTOF10: 4
PAINLEVEL_OUTOF10: 0

## 2022-04-03 NOTE — DISCHARGE SUMMARY
[facility] Physician Discharge Summary       DO Liudmila Urban[address] [facility]  [phone]        FOLLOW UP WITH OB PHYSICIAN IN NEXT 1 WEEK    Activity level: As tolerated     Dispo: Home      Condition on discharge: Stable     Patient ID:  Jana Savage  [id number]  27 y.o.  1990    Admit date: 3/30/2022    Discharge date and time:  4/3/2022  9:20 AM    Admission Diagnoses: Principal Problem:    Septicemia ([address]  Active Problems:    Hydronephrosis    Hypokalemia    Hyponatremia    Acidosis  Resolved Problems:    * No resolved hospital problems. *      Discharge Diagnoses: Principal Problem:    Septicemia ([address]  Active Problems:    Hydronephrosis    Hypokalemia    Hyponatremia    Acidosis  Resolved Problems:    * No resolved hospital problems. *      Consults:  IP CONSULT TO OB GYN  IP CONSULT TO UROLOGY  IP CONSULT TO PERINATOLOGY    Hospital Course:   Patient Jana Savage is a 27 y.o. presented with Hydronephrosis [N13.30]  Hypokalemia [E87.6]  Septicemia ([address] [A41.9]  Flank pain [R10.9]  Acute cystitis without hematuria [N30.00]  Sepsis ([address] [A41.9]    Hungsuzie is a 27year old woman with hx of hypothyroidism and currently pregnant 14 weeks gestation. Presented to ED with 1 week history of mild intermittent right flank and groin pain that acutely worsened and was severe on day of admission. She had associated urine incontinence and strongly malodorous urine. Sonogram in ED indicated right hydronephrosis concerning for calculi. She was started on Ceftriaxone and IV fluids, admitted and provided IV opiate pain control. Urine and blood cultures both growing a pansensitive E-Coli. Follow up cultures obtained 4/1 are negative for growth at this time. Plan to transition to oral Cefdinir to complete total 14 day course treatment of bacteremia. OB/GYN consulted and recommends then continuing with empiric antibiotics through remainder of pregnancy. exam:->pain What reading provider will be dictating this exam?->CRC; ORDERING SYSTEM PROVIDED HISTORY: pain TECHNOLOGIST PROVIDED HISTORY: Reason for exam:->pain What reading provider will be dictating this exam?->CRC FINDINGS: A single live intrauterine pregnancy is present. Fetal heart rate measures 159 beats per minute. There is normal fetal body and limb movement. The fetus is in cephalic position. The placenta is located posteriorly. BPD measures 2.48 cm. Head circumference measures 9.24 cm. Abdominal circumference measures 8.97 cm. Femur length measures 1.43 cm. Estimated fetal weight is 102.76 grams which correlates to 87.0 percentile based upon last menstrual period. Estimated gestational age by current ultrasound is 14 weeks 3 days. Single live intrauterine pregnancy with gestational age of 12 weeks 3 days by current sonographic biometry. The estimated due date is 9/25/2022. US DUP ABD PEL RETRO SCROT LIMITED    Result Date: 3/31/2022  EXAMINATION: DOPPLER EVALUATION OF THE PELVIS; TRIMESTER OBSTETRIC ULTRASOUND 3/30/2022 TECHNIQUE: Duplex ultrasound using B-mode/gray scaled imaging and Doppler spectral analysis and color flow was obtained of the pelvis. ; TRANSABDOMINAL PELVIC ULTRASOUND WITH COLOR DOPPLER FLOW HISTORY: ORDERING SYSTEM PROVIDED HISTORY: pain TECHNOLOGIST PROVIDED HISTORY: Reason for exam:->pain What reading provider will be dictating this exam?->CRC; ORDERING SYSTEM PROVIDED HISTORY: pain TECHNOLOGIST PROVIDED HISTORY: Reason for exam:->pain What reading provider will be dictating this exam?->CRC FINDINGS: A single live intrauterine pregnancy is present. Fetal heart rate measures 159 beats per minute. There is normal fetal body and limb movement. The fetus is in cephalic position. The placenta is located posteriorly. BPD measures 2.48 cm. Head circumference measures 9.24 cm. Abdominal circumference measures 8.97 cm. Femur length measures 1.43 cm.  Estimated fetal weight is 102.76 grams which correlates to 87.0 percentile based upon last menstrual period. Estimated gestational age by current ultrasound is 14 weeks 3 days. Single live intrauterine pregnancy with gestational age of 12 weeks 3 days by current sonographic biometry. The estimated due date is 9/25/2022. US RETROPERITONEAL COMPLETE    Result Date: 3/30/2022  EXAMINATION: RETROPERITONEAL ULTRASOUND OF THE KIDNEYS AND URINARY BLADDER 3/30/2022 COMPARISON: None HISTORY: ORDERING SYSTEM PROVIDED HISTORY: pain TECHNOLOGIST PROVIDED HISTORY: Reason for exam:->pain What reading provider will be dictating this exam?->CRC FINDINGS: Kidneys: The right kidney measures 12.0 cm in length and the left kidney measures 11.9 cm in length. Kidneys demonstrate normal cortical echogenicity. There is mild-to-moderate right hydronephrosis. No evidence of intrarenal stones. Bladder: Unremarkable appearance of the bladder. Mild-to-moderate right hydronephrosis. No definite intrauterine stone identified.        Patient Instructions:      Medication List      ASK your doctor about these medications    aspirin 81 MG EC tablet     ibuprofen 800 MG tablet  Commonly known as: ADVIL;MOTRIN  Take 1 tablet by mouth every 8 hours as needed for Pain     Prenatal Vitamin 27-0.8 MG Tabs     Synthroid 25 MCG tablet  Generic drug: levothyroxine              Note that more than 30 minutes was spent in preparing discharge papers, discussing discharge with patient, medication review, etc.    Signed:  Electronically signed by LUKE Blandon CNP on 4/3/2022 at 9:20 AM

## 2022-04-06 LAB
BLOOD CULTURE, ROUTINE: NORMAL
CULTURE, BLOOD 2: NORMAL

## 2022-04-13 ENCOUNTER — TELEPHONE (OUTPATIENT)
Dept: OBGYN CLINIC | Age: 32
End: 2022-04-13

## 2022-04-13 NOTE — TELEPHONE ENCOUNTER
Shayna left message on voicemail at [facility] asking for us to call her to schedule a follow up appointment. She was seen in the hospital on 4/1. Called back and got her voicemail.   Left message asking her to call back for appointment at [phone]

## 2022-05-11 ENCOUNTER — ANCILLARY PROCEDURE (OUTPATIENT)
Dept: OBGYN CLINIC | Age: 32
End: 2022-05-11
Payer: COMMERCIAL

## 2022-05-11 ENCOUNTER — ROUTINE PRENATAL (OUTPATIENT)
Dept: OBGYN CLINIC | Age: 32
End: 2022-05-11
Payer: COMMERCIAL

## 2022-05-11 VITALS
BODY MASS INDEX: 29.78 KG/M2 | HEART RATE: 121 BPM | DIASTOLIC BLOOD PRESSURE: 74 MMHG | SYSTOLIC BLOOD PRESSURE: 129 MMHG | WEIGHT: 173.5 LBS

## 2022-05-11 DIAGNOSIS — Z3A.20 20 WEEKS GESTATION OF PREGNANCY: Primary | ICD-10-CM

## 2022-05-11 DIAGNOSIS — N13.30 HYDRONEPHROSIS, UNSPECIFIED HYDRONEPHROSIS TYPE: ICD-10-CM

## 2022-05-11 LAB
GLUCOSE URINE, POC: NEGATIVE
PROTEIN UA: NEGATIVE

## 2022-05-11 PROCEDURE — 76817 TRANSVAGINAL US OBSTETRIC: CPT | Performed by: OBSTETRICS & GYNECOLOGY

## 2022-05-11 PROCEDURE — 76811 OB US DETAILED SNGL FETUS: CPT | Performed by: OBSTETRICS & GYNECOLOGY

## 2022-05-11 PROCEDURE — 81002 URINALYSIS NONAUTO W/O SCOPE: CPT | Performed by: OBSTETRICS & GYNECOLOGY

## 2022-05-11 PROCEDURE — 99213 OFFICE O/P EST LOW 20 MIN: CPT | Performed by: OBSTETRICS & GYNECOLOGY

## 2022-05-11 PROCEDURE — 99214 OFFICE O/P EST MOD 30 MIN: CPT | Performed by: OBSTETRICS & GYNECOLOGY

## 2022-05-11 RX ORDER — CEFDINIR 300 MG/1
300 CAPSULE ORAL DAILY
COMMUNITY

## 2022-05-11 NOTE — PATIENT INSTRUCTIONS
Patient Education        Weeks 18 to 22 of Your Pregnancy: Care Instructions  Overview     Your baby is continuing to develop quickly. Sometime between 18 and 22 weeks, you'll probably start to feel your baby move. At first, these small fetal movements feel like fluttering or \"butterflies. \" Or they may feel like gas bubbles. As your baby grows, these movements will become stronger. You may also notice that your baby hiccups. Babies at this stage cannow suck their thumbs. You may find that your nausea and fatigue are gone. You may feel better overall and have more energy than you did in your first trimester. But you might now also have some new discomforts, like sleep problems or leg cramps. Talk to yourdoctor about things you can do at home to ease these problems. Follow-up care is a key part of your treatment and safety. Be sure to make and go to all appointments, and call your doctor if you are having problems. It's also a good idea to know your test results and keep alist of the medicines you take. How can you care for yourself at home? Ease sleep problems   Avoid caffeine in drinks or chocolate late in the day.  Get some exercise every day.  Take a warm shower or bath before bed.  Have a light snack or glass of milk at bedtime.  Do relaxation exercises in bed to calm your mind and body.  Support your legs and back with extra pillows. Try a pillow between your legs if you sleep on your side.  Do not use sleeping pills or alcohol. They could harm your baby. Ease leg cramps   Do not massage your calf during the cramp.  Sit on a firm bed or chair. Straighten your leg, and bend your foot (flex your ankle) slowly upward, toward your knee. Bend your toes up and down.  Stand on a cool, flat surface. Stretch your toes upward, and take small steps walking on your heels.  Use a heating pad or hot water bottle to help with muscle ache. Prevent leg cramps   Be sure to get enough calcium.  If you are worried that you are not getting enough, talk to your doctor.  Exercise every day, and stretch your legs before bed.  Take a warm bath before bed, and try leg warmers at night. Where can you learn more? Go to https://yeny.healthRockwell Medical. org and sign in to your [employer] account. Enter A147 in the [facility] box to learn more about \"Weeks 18 to 22 of Your Pregnancy: Care Instructions. \"     If you do not have an account, please click on the \"Sign Up Now\" link. Current as of: June 16, 2021               Content Version: 13.2  © 7222-9571 VideoAvatars. Care instructions adapted under license by [facility] [place] ([facility]). If you have questions about a medical condition or this instruction, always ask your healthcare professional. [place] [address] any warranty or liability for your use of this information. Patient Education        Learning About When to Call Your Doctor During Pregnancy (Up to 20 Weeks)  Overview     It's common to have concerns about what might be a problem during your pregnancy. Most pregnancies don't have any serious problems. But it's stillimportant to know when to call your doctor if you have certain symptoms. These are general suggestions. Your doctor may give you some more informationabout when to call. When to call your doctor (up to 20 weeks)  Call 911 anytime you think you may need emergency care. For example, call if:   You passed out (lost consciousness). Call your doctor now or seek immediate medical care if:   You have a fever.  You have vaginal bleeding.  You are dizzy or lightheaded, or you feel like you may faint.  You have symptoms of a urinary tract infection. These may include:  ? Pain or burning when you urinate. ? A frequent need to urinate without being able to pass much urine. ? Pain in the flank, which is just below the rib cage and above the waist on either side of the back. ? Blood in your urine.    You have belly pain.  You think you are having contractions.  You have a sudden release of fluid from your vagina. Watch closely for changes in your health, and be sure to contact your doctor if:   You have vaginal discharge that smells bad.  You have other concerns about your pregnancy. Follow-up care is a key part of your treatment and safety. Be sure to make and go to all appointments, and call your doctor if you are having problems. It's also a good idea to know your test results and keep alist of the medicines you take. Where can you learn more? Go to https://TransNetpepiceweb.Original. org and sign in to your Tagbrand account. Enter T105 in the Me[employer]it box to learn more about \"Learning About When to Call Your Doctor During Pregnancy (Up to 20 Weeks). \"     If you do not have an account, please click on the \"Sign Up Now\" link. Current as of: June 16, 2021               Content Version: 13.2  © 2006-2022 [employer]. Care instructions adapted under license by [facility] ([facility]). If you have questions about a medical condition or this instruction, always ask your healthcare professional. [place] [address] any warranty or liability for your use of this information. Patient Education        Learning About When to Call Your Doctor During Pregnancy (After 20 Weeks)  Overview  It's common to have concerns about what might be a problem when you're pregnant. Most pregnancies don't have any serious problems. But it's still important to know when to call your doctor if you have certain symptoms orsigns of labor. These are general suggestions. Your doctor may give you some more informationabout when to call. When to call your doctor (after 20 weeks)  Call 911 anytime you think you may need emergency care. For example, call if:   You have severe vaginal bleeding.  You have sudden, severe pain in your belly.  You passed out (lost consciousness).    You have a seizure.  You see or feel the umbilical cord.  You think you are about to deliver your baby and can't make it safely to the hospital.  Call your doctor now or seek immediate medical care if:   You have vaginal bleeding.  You have belly pain.  You have a fever.  You have symptoms of preeclampsia, such as:  ? Sudden swelling of your face, hands, or feet. ? New vision problems (such as dimness, blurring, or seeing spots). ? A severe headache.  You have a sudden release of fluid from your vagina. (You think your water broke.)   You think that you may be in labor. This means that you've had at least 6 contractions in an hour.  You notice that your baby has stopped moving or is moving much less than normal.   You have symptoms of a urinary tract infection. These may include:  ? Pain or burning when you urinate. ? A frequent need to urinate without being able to pass much urine. ? Pain in the flank, which is just below the rib cage and above the waist on either side of the back. ? Blood in your urine. Watch closely for changes in your health, and be sure to contact your doctor if:   You have vaginal discharge that smells bad.  You have skin changes, such as:  ? A rash. ? Itching. ? Yellow color to your skin.  You have other concerns about your pregnancy. If you have labor signs at 37 weeks or more  If you have signs of labor at 37 weeks or more, your doctor may tell you tocall when your labor becomes more active. Symptoms of active labor include:   Contractions that are regular.  Contractions that are less than 5 minutes apart.  Contractions that are hard to talk through. Follow-up care is a key part of your treatment and safety. Be sure to make and go to all appointments, and call your doctor if you are having problems. It's also a good idea to know your test results and keep alist of the medicines you take. Where can you learn more? Go to https://chcindyeb.healthKiveda. org and sign in to your [employer] account. Enter  in the [facility] box to learn more about \"Learning About When to Call Your Doctor During Pregnancy (After 20 Weeks). \"     If you do not have an account, please click on the \"Sign Up Now\" link. Current as of: June 16, 2021               Content Version: 13.2  © 7488-2262 Healthwise, [facility]. Care instructions adapted under license by [facility] ([facility]). If you have questions about a medical condition or this instruction, always ask your healthcare professional. [address] any warranty or liability for your use of this information.

## 2022-05-11 NOTE — PROGRESS NOTES
[address] [address] [place], [place]. [address]  Ph: [phone] Fax: [phone]  May 11, 2022    RE: Nikki Sheehancayetano [birth year]   Dear Dr. Nancy Barlow: Thank you for sending  Ms. Severn   for consultation and ultrasound in our office on      SUMMARY: REASSURING EXAM TODAY. PRECAUTIONS REVIEWED  FOLLOWUP YOUR OFFICE. NEXT MFM APPT  7  weeks    Tracy Locks from hospital stay 2022-  14w Urosepsis, stones     REASON FOR VISIT   26 yo    at  20w 1 d  EGA    [facility] 22     ? Exhaustion/Fatigue/ Pregnancy o26.81  ? Family history of inheritable trait o35.2  ? Fetal Anatomy Survey z36  ? Fetal Growth and Development  o36.90x0  ? Kidney Dz/ pregnancy  o26.83  ? UTI  o23.1  ? Recurrent- currently on Omnicef nightly prophylaxis   ?  Her chart was reviewed, her medical, surgical , and OBG history was examined along with any supporting documents available to me .  Her recent clinical visits and notes were reviewed.  Her recent laboratory and pathology  testing was reviewed  · ~Note - DSUA neg proteinuria, neg glucosuria today     Review of Systems :    CONSTITUTIONAL: No fever, no chills , no undue aches, pain    HEENT: No headache, no visual changes, no sore throat . No loss of smell, taste   PULM: No dyspnea, no cough   CARDIO:  No chest pains, no palpitations   GI: No N/V, no D/C, no diarrhea, no abdominal pain     : No dysuria, no vaginal bleeding or fluid leaking or discharge    She reports good fetal movement   PHYSICAL EXAMINATION: VSS - Afebrile  BMI 29   54   173#  BP  129/74    General Appearance: Healthy looking, alert , no acute distress.  Eyes: No pallor, no icterus, no photophobia.  Back: No CVA tenderness.  Abdomen: Soft, non-tender.  Extremities: No pretibial pitting edema    An ultrasound evaluation was done today.  Please refer to the enclosed copy of the ultrasound report for further detailed information. ULTRASOUND IMPRESSION:    ? Within developmental and technical limits of ultrasound assessment,   ? Breech Male fetus @ 25w1d   ? Active, responsive baby. The amniotic fluid volume appears normal.   ? The fetus is measuring appropriate for gestational age. - 378g (o:7)  80%ile    ? There has been good interval growth and development . ? Fetal anatomy was reviewed and appears normal.  ? Transvaginal views of cervix appears to be closed, 43-52mm long, without significant funneling upon Valsalva. ? Soft markers for aneuploidy are examined and found to be favorable. - ~Many of the important findings cannot be fully assessed at this gestational age  ? She noted fetal movement, no cramping or contractions . Discussion- counseled to refer to OBG office for their recommendations   ? Seasonal Allergies - discussed OTC meds ( nonsedating- histamine-1  blockers; adjunct use for H2 blockers for GERD as well)   ? Sinuses -Discussed Active mgmt sinus congestion  Air purifier/ HEPA filter, humidifier in bedroom. ? PRN  nasal inhaler ( aromatic) -saline wash, do NOT recommend Neti Pot   - Possible nasal flonase-    ? Decongestant - Afrin Jannette Goltz as last resort. OTC yeast meds/  Acidophilus/( probiotics containing this) to address long term antibiotic exposure PRN     RECOMMENDATIONS:  1. Second   Trimester  concerns and precautions reviewed with patient. Discussed fetal movements and the role of  testing to help optimize growth and development and help predict/ avoid stress. Discussed trouble signs to watch for. 2. The patient is to continue to follow with you in your office for ongoing obstetric care. 3. Followup visit in 7 weeks . PRN  sooner if symptomatic   4. MFM Appointment has  been scheduled  22   5. I advised the patient that the [employer] of Obstetrics and Gynecology and The Society for Maternal Fetal Medicine recommend that all pregnant women be vaccinated for COVID-19. Information provided   6. Further evaluation and management will be dependent on her clinical presentation and the results of her testing. Once again, thank you for allowing us to participate in the care of this patient and if we can be of any further assistance to you, please do not hesitate to contact us. Sincerely,      Edith Epperson MD  I was present during her visit , supervised the ultrasound examination and provided the patient evaluation and management. The total time in minutes spent regarding the patient today, reviewing medical records, reviewing imaging studies, performing ultrasonic imaging, reviewing laboratory testing, and documenting information was 31 minutes, of which, 50% of the time was spent in patient education, counseling, and coordinating care with the patient, her provider, and/or her family. I answered all of her questions to her satisfaction.  I asked her to call if she had any additional questions prior to her next visit

## 2022-05-11 NOTE — LETTER
[facility] Maternal Fetal Medicine  [address] [place] [address]  Phone: [phone]  Fax: [phone]           Rogelio Oropeza MD      May 11, 2022     Patient: Cassie Armstrong   MR Number: [id number]   YOB: 1990   Date of Visit: 5/11/2022       Dear Dr. Eris Prabhakar: Thank you for referring Stefan Zambrano to me for evaluation/treatment. Below are the relevant portions of my assessment and plan of care. If you have questions, please do not hesitate to call me. I look forward to following [facility] along with you.     Sincerely,        Rogelio Oropeza MD    CC providers:  Costa Mahajan MD  [facility] [address]  [place] [place]

## 2022-05-11 NOTE — PROGRESS NOTES
Pt here for initial pregnancy ultrasound  Pt in hospital for UTI and was treated with antibiotics and is now taking antibiotics nightly  Pt feeling fetal movement  Pt denies any cramping/bleeding/lof

## 2022-06-29 ENCOUNTER — ANCILLARY PROCEDURE (OUTPATIENT)
Dept: OBGYN CLINIC | Age: 32
End: 2022-06-29
Payer: COMMERCIAL

## 2022-06-29 ENCOUNTER — ROUTINE PRENATAL (OUTPATIENT)
Dept: OBGYN CLINIC | Age: 32
End: 2022-06-29
Payer: COMMERCIAL

## 2022-06-29 VITALS
SYSTOLIC BLOOD PRESSURE: 117 MMHG | HEART RATE: 113 BPM | WEIGHT: 189 LBS | DIASTOLIC BLOOD PRESSURE: 77 MMHG | BODY MASS INDEX: 32.44 KG/M2

## 2022-06-29 DIAGNOSIS — Z3A.27 27 WEEKS GESTATION OF PREGNANCY: Primary | ICD-10-CM

## 2022-06-29 DIAGNOSIS — N13.30 HYDRONEPHROSIS, UNSPECIFIED HYDRONEPHROSIS TYPE: ICD-10-CM

## 2022-06-29 LAB
GLUCOSE URINE, POC: NEGATIVE
PROTEIN UA: POSITIVE

## 2022-06-29 PROCEDURE — 99213 OFFICE O/P EST LOW 20 MIN: CPT | Performed by: OBSTETRICS & GYNECOLOGY

## 2022-06-29 PROCEDURE — 76805 OB US >/= 14 WKS SNGL FETUS: CPT | Performed by: OBSTETRICS & GYNECOLOGY

## 2022-06-29 PROCEDURE — 81002 URINALYSIS NONAUTO W/O SCOPE: CPT | Performed by: OBSTETRICS & GYNECOLOGY

## 2022-06-29 PROCEDURE — 99212 OFFICE O/P EST SF 10 MIN: CPT | Performed by: OBSTETRICS & GYNECOLOGY

## 2022-06-29 NOTE — LETTER
[facility] Maternal Fetal Medicine  [address] [place] [address]  Phone: [phone]  Fax: [phone]           Sammy Hernandez MD      July 5, 2022     Patient: Erica Zamora   MR Number: [id number]   YOB: 1990   Date of Visit: 6/29/2022       Dear Dr. Saeed [facility]: Thank you for referring Milton Fernandess to me for evaluation/treatment. Below are the relevant portions of my assessment and plan of care. If you have questions, please do not hesitate to call me. I look forward to following [facility] along with you.     Sincerely,        Sammy Hernandez MD    CC providers:  Severino Echavarria MD  [facility] [address]  [place]

## 2022-06-29 NOTE — PATIENT INSTRUCTIONS
Patient Education        Weeks 26 to 30 of Your Pregnancy: Care Instructions  Overview     You are now entering your last trimester of pregnancy. Your baby is growing quickly. Mala Penaing probably feel your baby moving around more often. Your doctormay ask you to count your baby's kicks. Your back may ache as your body gets used to your baby's size and length. If you haven't already had the Tdap shot during this pregnancy, talk to your doctor about getting it. It will help protect your  against pertussisinfection. During this time, it's important to take care of yourself and pay attention to what your body needs. If you feel sexual, you can explore ways to be close withyour partner that match your comfort and desire. Follow-up care is a key part of your treatment and safety. Be sure to make and go to all appointments, and call your doctor if you are having problems. It's also a good idea to know your test results and keep alist of the medicines you take. How can you care for yourself at home? Take it easy at work   Take frequent breaks. If possible, stop working when you are tired, and rest during your lunch hour.  Take bathroom breaks every 2 hours.  Change positions often. If you sit for long periods, stand up and walk around.  When you stand for a long time, keep one foot on a low stool with your knee bent. After standing a lot, sit with your feet up.  Avoid fumes, chemicals, and tobacco smoke. Be sexual in your own way   Having sex during pregnancy is okay, unless your doctor tells you not to.  You may be very interested in sex, or you may have no interest at all.  Your growing belly can make it hard to find a good position during intercourse. [place] and explore.  You may get cramps in your uterus when your partner touches your breasts.  A back rub may relieve the backache or cramps that sometimes follow orgasm. Learn about  labor   Watch for signs of  labor.  You may be going into labor if:  ? You have menstrual-like cramps, with or without nausea. ? You have about 6 or more contractions in 1 hour, even after you have had a glass of water and are resting. ? You have a low, dull backache that does not go away when you change your position. ? You have pain or pressure in your pelvis that comes and goes in a pattern. ? You have intestinal cramping or flu-like symptoms, with or without diarrhea.  ? You notice an increase or change in your vaginal discharge. Discharge may be heavy, mucus-like, watery, or streaked with blood. ? Your water breaks.  If you think you have  labor:  ? Drink 2 or 3 glasses of water or juice. Not drinking enough fluids can cause contractions. ? Stop what you are doing, and empty your bladder. Then lie down on your left side for at least 1 hour. ? While lying on your side, find your breast bone. Put your fingers in the soft spot just below it. Move your fingers down toward your belly button to find the top of your uterus. Check to see if it is tight. ? Contractions can be weak or strong. Record your contractions for an hour. Time a contraction from the start of one contraction to the start of the next one.  ? Single or several strong contractions without a pattern are called Delphos-Dalal contractions. They are practice contractions but not the start of labor. They often stop if you change what you are doing. ? Call your doctor if you have regular contractions. Where can you learn more? Go to https://Promoltacollette.healthBountysource. org and sign in to your [employer] account. Enter T758 in the [facility] box to learn more about \"Weeks 26 to 30 of Your Pregnancy: Care Instructions. \"     If you do not have an account, please click on the \"Sign Up Now\" link. Current as of: 2022               Content Version: 13.3  © 2697-3835 Healthwise, Incorporated. Care instructions adapted under license by [facility] ([facility]).  If you have questions about a medical condition or this instruction, always ask your healthcare professional. [place] [address] any warranty or liability for your use of this information. Patient Education        Learning About When to Call Your Doctor During Pregnancy (After 20 Weeks)  Overview  It's common to have concerns about what might be a problem when you're pregnant. Most pregnancies don't have any serious problems. But it's still important to know when to call your doctor if you have certain symptoms orsigns of labor. These are general suggestions. Your doctor may give you some more informationabout when to call. When to call your doctor (after 20 weeks)  Call 911 anytime you think you may need emergency care. For example, call if:   You have severe vaginal bleeding.  You have sudden, severe pain in your belly.  You passed out (lost consciousness).  You have a seizure.  You see or feel the umbilical cord.  You think you are about to deliver your baby and can't make it safely to the hospital.  Call your doctor now or seek immediate medical care if:   You have vaginal bleeding.  You have belly pain.  You have a fever.  You have symptoms of preeclampsia, such as:  ? Sudden swelling of your face, hands, or feet. ? New vision problems (such as dimness, blurring, or seeing spots). ? A severe headache.  You have a sudden release of fluid from your vagina. (You think your water broke.)   You think that you may be in labor. This means that you've had at least 6 contractions in an hour.  You notice that your baby has stopped moving or is moving much less than normal.   You have symptoms of a urinary tract infection. These may include:  ? Pain or burning when you urinate. ? A frequent need to urinate without being able to pass much urine. ? Pain in the flank, which is just below the rib cage and above the waist on either side of the back. ? Blood in your urine.   Watch closely for changes in your health, and be sure to contact your doctor if:   You have vaginal discharge that smells bad.  You have skin changes, such as:  ? A rash. ? Itching. ? Yellow color to your skin.  You have other concerns about your pregnancy. If you have labor signs at 37 weeks or more  If you have signs of labor at 37 weeks or more, your doctor may tell you tocall when your labor becomes more active. Symptoms of active labor include:   Contractions that are regular.  Contractions that are less than 5 minutes apart.  Contractions that are hard to talk through. Follow-up care is a key part of your treatment and safety. Be sure to make and go to all appointments, and call your doctor if you are having problems. It's also a good idea to know your test results and keep alist of the medicines you take. Where can you learn more? Go to https://EndoBiologics Internationalpepiceweb.Soulstice Endeavors. org and sign in to your Thumb Reading account. Enter  in the [employer] box to learn more about \"Learning About When to Call Your Doctor During Pregnancy (After 20 Weeks). \"     If you do not have an account, please click on the \"Sign Up Now\" link. Current as of: February 23, 2022               Content Version: 13.3  © 3214-6541 Healthwise, Incorporated. Care instructions adapted under license by [facility] ([facility]). If you have questions about a medical condition or this instruction, always ask your healthcare professional. [place] [address] any warranty or liability for your use of this information. Please arrive for your scheduled appointment at least 15 minutes early with your actual insurance card+ a photo ID. Also if you need any refills ordered or have questions, it may take up 48 hours to reply. Please allow ample time for your refills. Call me when you use last refill. Thank you for your cooperation.  Call your primary obstetrician with bleeding, leaking of fluid, abdominal tenderness, headache, blurry vision, epigastric pain and increased urinary frequency. If you are experiencing an emergency and need immediate help, call 911 or go to go emergency room or labor and delivery. if you are sick, not feeling well or have an infectious process going on please reschedule your appointment by calling [phone]. Also if any family members are not feeling well, please do not bring them to your appointment. We appreciate your cooperation. We are doing this in order to protect our pregnant mothers+ their babies. if you are sick, not feeling well or have an infectious process going on please reschedule your appointment by calling [phone]. Also if any family members are not feeling well, please do not bring them to your appointment. We appreciate your cooperation. We are doing this in order to protect our pregnant mothers+ their babies.

## 2022-07-05 NOTE — PROGRESS NOTES
[facility]  [address] [place] [address].  [address]  [facility] [address]  Ph: [phone] Fax: [phone]  2022     RE: Yin Horn [birth year]   Dear Dr. Cholo Sanon: Thank you for sending  Ms. Severn   for consultation and ultrasound in our office on  22      SUMMARY: REASSURING EXAM TODAY. PRECAUTIONS REVIEWED  FOLLOWUP YOUR OFFICE. NEXT MFM APPT  3  weeks     Folowup visit from hospital stay 2022-  14w Urosepsis, stones      REASON FOR VISIT   26 yo    at  27w 1 d  [facility] 22     · Exhaustion/Fatigue/ Pregnancy o26.81  · Family history of inheritable trait o35.2  · Fetal Anatomy Survey z36  · Fetal Growth and Development  o36.90x0  · Kidney Dz/ pregnancy  o26.83  ? UTI  o23.1  ? Recurrent- currently on Omnicef nightly prophylaxis   ? · Her chart was reviewed, her medical, surgical , and OBG history was examined along with any supporting documents available to me . · Her recent clinical visits and notes were reviewed. · Her recent laboratory and pathology  testing was reviewed  · ~Note - DSUA Trace  proteinuria, neg glucosuria today      Review of Systems :   Feeling much netter   · CONSTITUTIONAL: No fever, no chills , no undue aches, pain   · HEENT: No headache, no visual changes, no sore throat . No loss of smell, taste  · PULM: No dyspnea, no cough  · CARDIO:  No chest pains, no palpitations  · GI: No N/V, no D/C, no diarrhea, no abdominal pain   ·  : No dysuria, no vaginal bleeding or fluid leaking or discharge   · She reports good fetal movement   PHYSICAL EXAMINATION: VSS - Afebrile  BMI 32  54   189#  BP  117/77  · General Appearance: Healthy looking, alert , no acute distress. · Eyes: No pallor, no icterus, no photophobia. · Back: No CVA tenderness. · Abdomen: Soft, non-tender. · Extremities: No pretibial pitting edema     An ultrasound evaluation was done today.  Please refer to the enclosed copy of the ultrasound report for further detailed information. ULTRASOUND IMPRESSION:    · Within developmental and technical limits of ultrasound assessment,   ? Vertex Male fetus @ 27w1d   ? Active, responsive baby. The amniotic fluid volume appears normal.   ? The fetus is measuring appropriate for gestational age. § 1138g (2:8)  63%ile    ? There has been good interval growth and development . ? Fetal anatomy was reviewed and appears normal.  ? Transvaginal views of cervix appears to be closed, 43-52mm long, without significant funneling upon Valsalva. ? Soft markers for aneuploidy are examined and found to be favorable. § ~Many of the important findings cannot be fully assessed at this gestational age  · She noted fetal movement, no cramping or contractions . Discussion- counseled to refer to OBG office for their recommendations   ? Seasonal Allergies - discussed OTC meds ( nonsedating- histamine-1  blockers; adjunct use for H2 blockers for GERD as well)   ? Sinuses -Discussed Active mgmt sinus congestion  Air purifier/ HEPA filter, humidifier in bedroom. ? PRN  nasal inhaler ( aromatic) -saline wash, do NOT recommend Neti Pot   § Possible nasal flonase-    ? Decongestant - Jasonrin Darrick Jay as last resort. OTC yeast meds/  Acidophilus/( probiotics containing this) to address long term antibiotic exposure PRN      RECOMMENDATIONS:  1. Third    Trimester  concerns and precautions reviewed with patient. Discussed fetal movements and the role of  testing to help optimize growth and development and help predict/ avoid stress. Discussed trouble signs to watch for. 2. Reviewed summertime heat, hydration precautions - avoid mosquitoes / ticks  3. The patient is to continue to follow with you in your office for ongoing obstetric care. 4. Followup visit in 3  weeks . 5. MFM Appointment has  been scheduled  22   6.  Further evaluation and management will be dependent on her clinical presentation and the results of her testing. Once again, thank you for allowing us to participate in the care of this patient and if we can be of any further assistance to you, please do not hesitate to contact us. Sincerely,        Henri Ruano MD  I was present during her visit , supervised the ultrasound examination and provided the patient evaluation and management. The total time in minutes spent regarding the patient today, reviewing medical records, reviewing imaging studies, performing ultrasonic imaging, reviewing laboratory testing, and documenting information was 16 minutes, of which, 50% of the time was spent in patient education, counseling, and coordinating care with the patient, her provider, and/or her family. I answered all of her questions to her satisfaction.  I asked her to call if she had any additional questions prior to her next visit

## 2022-07-21 ENCOUNTER — ROUTINE PRENATAL (OUTPATIENT)
Dept: OBGYN CLINIC | Age: 32
End: 2022-07-21
Payer: COMMERCIAL

## 2022-07-21 ENCOUNTER — ANCILLARY PROCEDURE (OUTPATIENT)
Dept: OBGYN CLINIC | Age: 32
End: 2022-07-21
Payer: COMMERCIAL

## 2022-07-21 VITALS
BODY MASS INDEX: 32.44 KG/M2 | DIASTOLIC BLOOD PRESSURE: 81 MMHG | HEART RATE: 110 BPM | SYSTOLIC BLOOD PRESSURE: 124 MMHG | HEIGHT: 64 IN | WEIGHT: 190 LBS

## 2022-07-21 DIAGNOSIS — Z3A.30 30 WEEKS GESTATION OF PREGNANCY: Primary | ICD-10-CM

## 2022-07-21 LAB
GLUCOSE URINE, POC: NORMAL
PROTEIN UA: NEGATIVE

## 2022-07-21 PROCEDURE — 76820 UMBILICAL ARTERY ECHO: CPT | Performed by: OBSTETRICS & GYNECOLOGY

## 2022-07-21 PROCEDURE — 76816 OB US FOLLOW-UP PER FETUS: CPT | Performed by: OBSTETRICS & GYNECOLOGY

## 2022-07-21 PROCEDURE — 81002 URINALYSIS NONAUTO W/O SCOPE: CPT | Performed by: OBSTETRICS & GYNECOLOGY

## 2022-07-21 PROCEDURE — 99213 OFFICE O/P EST LOW 20 MIN: CPT | Performed by: OBSTETRICS & GYNECOLOGY

## 2022-07-21 PROCEDURE — 76819 FETAL BIOPHYS PROFIL W/O NST: CPT | Performed by: OBSTETRICS & GYNECOLOGY

## 2022-07-21 PROCEDURE — 76821 MIDDLE CEREBRAL ARTERY ECHO: CPT | Performed by: OBSTETRICS & GYNECOLOGY

## 2022-07-21 PROCEDURE — 99212 OFFICE O/P EST SF 10 MIN: CPT | Performed by: OBSTETRICS & GYNECOLOGY

## 2022-07-25 NOTE — PROGRESS NOTES
[facility]  [address] [place] [address].  Luis Qiu [place], [place]. [address]  Ph: [phone] Fax: [phone]  2022     RE: Jeremy Pack [birth year]  Dear Dr. Eric Argueta: Thank you for sending  Ms. Severn   for consultation and ultrasound in our office on  22       SUMMARY: REASSURING EXAM TODAY. PRECAUTIONS REVIEWED  FOLLOWUP YOUR OFFICE. NEXT MFM APPT  4   weeks w/ Dr Tanner Albarado     OB History 33yo  4. Para 2  @ 30w2d   [id number]   Risk Factors Hypothyroidism--Synthroid 25mcg/D  Hx of maternal hydronephrosis and kidney stone  Hx of UTI- Daily Omnicef prophylaxis        Exhaustion/Fatigue/ Pregnancy o26.81   Family history of inheritable trait o35.2    Fetal Growth and Development  o36.90x0   Kidney Dz/ pregnancy  o26.83  ?    ?     Her chart was reviewed, her medical, surgical , and OBG history was examined along with any supporting documents available to me .  Her recent clinical visits and notes were reviewed.  Her recent laboratory and pathology  testing was reviewed   ~Note - DSUA Trace  proteinuria, neg glucosuria today     Review of Systems :   Feeling much better   CONSTITUTIONAL: No fever, no chills , no undue aches, pain   HEENT: No headache, no visual changes, no sore throat . No loss of smell, taste   PULM: No dyspnea, no cough   CARDIO:  No chest pains, no palpitations   GI: No N/V, no D/C, no diarrhea, no abdominal pain    : No dysuria, no vaginal bleeding or fluid leaking or discharge   She reports good fetal movement  PHYSICAL EXAMINATION: VSS - Afebrile  BMI 32  54   190#  BP  124/81    General Appearance: Healthy looking, alert , no acute distress.  Eyes: No pallor, no icterus, no photophobia.  Back: No CVA tenderness.  Abdomen: Soft, non-tender.  Extremities: No pretibial pitting edema     An ultrasound evaluation was done today.  Please refer to the enclosed copy of the ultrasound report for further detailed information. ULTRASOUND IMPRESSION:     Within developmental and technical limits of ultrasound assessment,  1. Vertex male at 30w 2d by clinical NICKO. 2. Anatomic survey performed as noted above. Anatomy was Suboptimal due to fetal position and advanced gestational age. 3. There is appropriate interval growth. 4. EFW is 73% at 1824 g. (4:0)   5. Amniotic fluid appeared normal amount. 6. Placenta is posterior without evidence of previa. 7. BPP 8/8.  8. Umbilical artery dopplers were within normal limits. Discussion- counseled to refer to OBG office for their recommendations  ? Seasonal Allergies - discussed OTC meds ( nonsedating- histamine-1  blockers; adjunct use for H2 blockers for GERD as well)  ? Sinuses -Discussed Active mgmt sinus congestion  Air purifier/ HEPA filter, humidifier in bedroom. ? PRN  nasal inhaler ( aromatic) -saline wash, do NOT recommend Neti Pot  - Possible nasal flonase-    ? Decongestant - Afrin Dorothea Riddles as last resort. OTC yeast meds/  Acidophilus/( probiotics containing this) to address long term antibiotic exposure PRN      RECOMMENDATIONS:  1. Third    Trimester  concerns and precautions reviewed with patient. Discussed fetal movements and the role of  testing to help optimize growth and development and help predict/ avoid stress. Discussed trouble signs to watch for. 2. Reviewed summertime heat, hydration precautions - avoid mosquitoes / ticks  3. The patient is to continue to follow with you in your office for ongoing obstetric care. 4. Followup visit in 4   weeks . 5. MFM Appointment has  been scheduled  22  6. Further evaluation and management will be dependent on her clinical presentation and the results of her testing. Once again, thank you for allowing us to participate in the care of this patient and if we can be of any further assistance to you, please do not hesitate to contact us.   Sincerely,        Nancy Gayle MD  I was

## 2022-08-18 ENCOUNTER — ANCILLARY PROCEDURE (OUTPATIENT)
Dept: OBGYN CLINIC | Age: 32
End: 2022-08-18
Payer: COMMERCIAL

## 2022-08-18 ENCOUNTER — ROUTINE PRENATAL (OUTPATIENT)
Dept: OBGYN CLINIC | Age: 32
End: 2022-08-18
Payer: COMMERCIAL

## 2022-08-18 VITALS
HEART RATE: 114 BPM | WEIGHT: 194 LBS | SYSTOLIC BLOOD PRESSURE: 137 MMHG | DIASTOLIC BLOOD PRESSURE: 80 MMHG | BODY MASS INDEX: 33.3 KG/M2

## 2022-08-18 DIAGNOSIS — Z3A.34 34 WEEKS GESTATION OF PREGNANCY: ICD-10-CM

## 2022-08-18 DIAGNOSIS — Z34.90 PREGNANCY, UNSPECIFIED GESTATIONAL AGE: Primary | ICD-10-CM

## 2022-08-18 DIAGNOSIS — Z34.90 FOURTH PREGNANCY: ICD-10-CM

## 2022-08-18 PROBLEM — Z3A.40 40 WEEKS GESTATION OF PREGNANCY: Status: RESOLVED | Noted: 2017-11-30 | Resolved: 2022-08-18

## 2022-08-18 LAB
GLUCOSE URINE, POC: NORMAL
PROTEIN UA: NEGATIVE

## 2022-08-18 PROCEDURE — 81002 URINALYSIS NONAUTO W/O SCOPE: CPT | Performed by: OBSTETRICS & GYNECOLOGY

## 2022-08-18 PROCEDURE — 76816 OB US FOLLOW-UP PER FETUS: CPT | Performed by: OBSTETRICS & GYNECOLOGY

## 2022-08-18 PROCEDURE — 99999 PR OFFICE/OUTPT VISIT,PROCEDURE ONLY: CPT | Performed by: OBSTETRICS & GYNECOLOGY

## 2022-08-18 PROCEDURE — 99213 OFFICE O/P EST LOW 20 MIN: CPT | Performed by: OBSTETRICS & GYNECOLOGY

## 2022-08-18 NOTE — PROGRESS NOTES
Patient is here today for 4 wk f/u. Denies any vaginal bleeding, cramping, or leakage of fluids. Patient reports good fetal movement.

## 2022-08-18 NOTE — PROGRESS NOTES
[address] FETAL MEDICINE  [facility] [address] [phone]   [address] FETAL MEDICINE  [address]  [facility] [address]  Dept: [address] [facility] [address]: [phone]     2022    RE:  Lian Roberts     : 1990   AGE: 27 y.o. Dear Dr. Marcelina Street,    Thank you for allowing me to see Lian Roberts. As I'm sure you will recall, Lian Roberts is a 27 y.o. [id number]'s last menstrual period was 2021.  Estimated Date of Delivery: 22 at 34w2d seen in our office today for the following:    REASON FOR VISIT: Growth     Patient Active Problem List    Diagnosis Date Noted    Fourth pregnancy 2022    Septicemia ([address] 2022    Hydronephrosis 2022    Hypokalemia     Hyponatremia     Acidosis     34 weeks gestation of pregnancy 2017        PAST HISTORY:  OB History    Para Term  AB Living   4 2 2   1 2   SAB IAB Ectopic Molar Multiple Live Births             2      # Outcome Date GA Lbr Jono/2nd Weight Sex Delivery Anes PTL Lv   4 Current            3 AB 21 14w0d    SAB      2 Term 17 40w0d  8 lb 10 oz (3.912 kg) M Vag-Spont EPI N RAZIA   1 Term 07/06/15 39w4d   F Vag-Spont   RAZIA          MEDICAL:  Past Medical History:   Diagnosis Date    Abnormal Pap smear of cervix 2013    HPV once; normal since    HPV (human papilloma virus) anogenital infection     Hypothyroidism     taking synthroid     Kidney infection     Kidney stone         SURGICAL:  Past Surgical History:   Procedure Laterality Date    COLPOSCOPY      DILATION AND CURETTAGE      TYMPANOSTOMY TUBE PLACEMENT         ALLERGIES:    No Known Allergies      MEDICATIONS:    Current Outpatient Medications   Medication Sig Dispense Refill    cefdinir (OMNICEF) 300 MG capsule Take 300 mg by mouth daily aspirin 81 MG EC tablet Take 81 mg by mouth daily      levothyroxine (SYNTHROID) 25 MCG tablet Take 25 mcg by mouth Daily      Prenatal Vit-Fe Fumarate-FA (PRENATAL VITAMIN) 27-0.8 MG TABS Take 1 tablet by mouth daily       No current facility-administered medications for this visit. Social History     Socioeconomic History    Marital status:    Tobacco Use    Smoking status: Never    Smokeless tobacco: Never   Vaping Use    Vaping Use: Never used   Substance and Sexual Activity    Alcohol use: Not Currently    Drug use: No    Sexual activity: Not Currently     Partners: Male          FAMILY MEDICAL HISTORY:   Family History   Problem Relation Age of Onset    Other Father         cardiovascular disease    No Known Problems Mother               PHYSICAL EXAMINATION:  /80   Pulse (!) 114   Wt 194 lb (88 kg)   LMP 12/23/2021   Body mass index is 33.3 kg/m². Urine dipstick:  Results for POC orders placed in visit on 08/18/22   POCT urine qual dipstick protein   Result Value Ref Range    Protein, UA Negative Negative   POCT urine qual dipstick glucose   Result Value Ref Range    Glucose, UA POC neg         A/an Growth  ultrasound was done in our office today. Please refer to the enclosed copy of the ultrasound report for further information. Discussion:  There is a brown fetus in a vertex presentation. Fetal cardiac motion, and fetal motion was detected in. Be grossly normal.  There is been appropriate interval growth. Baby is AGA at the 85th percentile. Amniotic fluid volume is normal.  The placenta is posterior. IMPRESSION:  1. Single intrauterine gestation at 34+ weeks with repeat interval growth. 2. The fetal anatomy appears normal and the fetal lie is cephalic. 3. The amniotic fluid volume is normal and the placenta is posterior. RECOMMENDATIONS:  Each of the recommendations were discussed with the patient:  1. Follow-up in 4 weeks for growth  2.   I agree with the decision to deliver at 39 weeks gestation. 3.  Follow-up would be otherwise as clinically indicated. The patient is to continue to follow with you in your office for ongoing obstetric care. PLAN:    As noted above or sooner prn.     Sincerely,        Reinaldo Santillan MD

## 2022-09-15 ENCOUNTER — ROUTINE PRENATAL (OUTPATIENT)
Dept: OBGYN CLINIC | Age: 32
End: 2022-09-15
Payer: COMMERCIAL

## 2022-09-15 ENCOUNTER — ANCILLARY PROCEDURE (OUTPATIENT)
Dept: OBGYN CLINIC | Age: 32
End: 2022-09-15
Payer: COMMERCIAL

## 2022-09-15 VITALS
WEIGHT: 202 LBS | DIASTOLIC BLOOD PRESSURE: 68 MMHG | BODY MASS INDEX: 34.67 KG/M2 | HEART RATE: 93 BPM | SYSTOLIC BLOOD PRESSURE: 137 MMHG

## 2022-09-15 DIAGNOSIS — Z34.90 PREGNANCY, UNSPECIFIED GESTATIONAL AGE: Primary | ICD-10-CM

## 2022-09-15 LAB
GLUCOSE URINE, POC: NORMAL
PROTEIN UA: NEGATIVE

## 2022-09-15 PROCEDURE — 99213 OFFICE O/P EST LOW 20 MIN: CPT | Performed by: OBSTETRICS & GYNECOLOGY

## 2022-09-15 PROCEDURE — 99999 PR OFFICE/OUTPT VISIT,PROCEDURE ONLY: CPT | Performed by: OBSTETRICS & GYNECOLOGY

## 2022-09-15 PROCEDURE — 76816 OB US FOLLOW-UP PER FETUS: CPT | Performed by: OBSTETRICS & GYNECOLOGY

## 2022-09-15 PROCEDURE — 76820 UMBILICAL ARTERY ECHO: CPT | Performed by: OBSTETRICS & GYNECOLOGY

## 2022-09-15 PROCEDURE — 76818 FETAL BIOPHYS PROFILE W/NST: CPT | Performed by: OBSTETRICS & GYNECOLOGY

## 2022-09-15 PROCEDURE — 81002 URINALYSIS NONAUTO W/O SCOPE: CPT | Performed by: OBSTETRICS & GYNECOLOGY

## 2022-09-15 PROCEDURE — 76821 MIDDLE CEREBRAL ARTERY ECHO: CPT | Performed by: OBSTETRICS & GYNECOLOGY

## 2022-09-21 ENCOUNTER — APPOINTMENT (OUTPATIENT)
Dept: LABOR AND DELIVERY | Age: 32
End: 2022-09-21
Payer: COMMERCIAL

## 2022-09-21 ENCOUNTER — ANESTHESIA EVENT (OUTPATIENT)
Dept: LABOR AND DELIVERY | Age: 32
End: 2022-09-21
Payer: COMMERCIAL

## 2022-09-21 ENCOUNTER — HOSPITAL ENCOUNTER (INPATIENT)
Age: 32
LOS: 3 days | Discharge: HOME | End: 2022-09-24
Attending: OBSTETRICS & GYNECOLOGY | Admitting: OBSTETRICS & GYNECOLOGY
Payer: COMMERCIAL

## 2022-09-21 ENCOUNTER — ANESTHESIA (OUTPATIENT)
Dept: LABOR AND DELIVERY | Age: 32
End: 2022-09-21
Payer: COMMERCIAL

## 2022-09-21 PROBLEM — Z34.93 NORMAL PREGNANCY IN THIRD TRIMESTER: Status: ACTIVE | Noted: 2022-09-21

## 2022-09-21 LAB
ABO/RH: NORMAL
AMPHETAMINE SCREEN, URINE: NOT DETECTED
ANTIBODY SCREEN: NORMAL
BARBITURATE SCREEN URINE: NOT DETECTED
BASOPHILS ABSOLUTE: 0.05 E9/L (ref 0–0.2)
BASOPHILS RELATIVE PERCENT: 0.6 % (ref 0–2)
BENZODIAZEPINE SCREEN, URINE: NOT DETECTED
CANNABINOID SCREEN URINE: NOT DETECTED
COCAINE METABOLITE SCREEN URINE: NOT DETECTED
EOSINOPHILS ABSOLUTE: 0.25 E9/L (ref 0.05–0.5)
EOSINOPHILS RELATIVE PERCENT: 2.8 % (ref 0–6)
FENTANYL SCREEN, URINE: NOT DETECTED
HCT VFR BLD CALC: 38.3 % (ref 34–48)
HEMOGLOBIN: 13.4 G/DL (ref 11.5–15.5)
IMMATURE GRANULOCYTES #: 0.07 E9/L
IMMATURE GRANULOCYTES %: 0.8 % (ref 0–5)
LYMPHOCYTES ABSOLUTE: 1.2 E9/L (ref 1.5–4)
LYMPHOCYTES RELATIVE PERCENT: 13.4 % (ref 20–42)
Lab: NORMAL
MCH RBC QN AUTO: 33 PG (ref 26–35)
MCHC RBC AUTO-ENTMCNC: 35 % (ref 32–34.5)
MCV RBC AUTO: 94.3 FL (ref 80–99.9)
METHADONE SCREEN, URINE: NOT DETECTED
MONOCYTES ABSOLUTE: 0.71 E9/L (ref 0.1–0.95)
MONOCYTES RELATIVE PERCENT: 7.9 % (ref 2–12)
NEUTROPHILS ABSOLUTE: 6.7 E9/L (ref 1.8–7.3)
NEUTROPHILS RELATIVE PERCENT: 74.5 % (ref 43–80)
OPIATE SCREEN URINE: NOT DETECTED
OXYCODONE URINE: NOT DETECTED
PDW BLD-RTO: 13 FL (ref 11.5–15)
PHENCYCLIDINE SCREEN URINE: NOT DETECTED
PLATELET # BLD: 208 E9/L (ref 130–450)
PMV BLD AUTO: 11.8 FL (ref 7–12)
RBC # BLD: 4.06 E12/L (ref 3.5–5.5)
WBC # BLD: 9 E9/L (ref 4.5–11.5)

## 2022-09-21 PROCEDURE — 3700000025 EPIDURAL BLOCK: Performed by: ANESTHESIOLOGY

## 2022-09-21 PROCEDURE — 6370000000 HC RX 637 (ALT 250 FOR IP): Performed by: OBSTETRICS & GYNECOLOGY

## 2022-09-21 PROCEDURE — 3E0P7VZ INTRODUCTION OF HORMONE INTO FEMALE REPRODUCTIVE, VIA NATURAL OR ARTIFICIAL OPENING: ICD-10-PCS | Performed by: OBSTETRICS & GYNECOLOGY

## 2022-09-21 PROCEDURE — 1220000001 HC SEMI PRIVATE L&D R&B

## 2022-09-21 PROCEDURE — 2580000003 HC RX 258: Performed by: OBSTETRICS & GYNECOLOGY

## 2022-09-21 PROCEDURE — 80307 DRUG TEST PRSMV CHEM ANLYZR: CPT

## 2022-09-21 PROCEDURE — 6360000002 HC RX W HCPCS: Performed by: OBSTETRICS & GYNECOLOGY

## 2022-09-21 PROCEDURE — 99024 POSTOP FOLLOW-UP VISIT: CPT | Performed by: PHYSICIAN ASSISTANT

## 2022-09-21 PROCEDURE — 86850 RBC ANTIBODY SCREEN: CPT

## 2022-09-21 PROCEDURE — 85025 COMPLETE CBC W/AUTO DIFF WBC: CPT

## 2022-09-21 PROCEDURE — 86900 BLOOD TYPING SEROLOGIC ABO: CPT

## 2022-09-21 PROCEDURE — 86901 BLOOD TYPING SEROLOGIC RH(D): CPT

## 2022-09-21 PROCEDURE — 2500000003 HC RX 250 WO HCPCS: Performed by: ANESTHESIOLOGY

## 2022-09-21 PROCEDURE — 36415 COLL VENOUS BLD VENIPUNCTURE: CPT

## 2022-09-21 RX ORDER — CARBOPROST TROMETHAMINE 250 UG/ML
250 INJECTION, SOLUTION INTRAMUSCULAR PRN
Status: DISCONTINUED | OUTPATIENT
Start: 2022-09-21 | End: 2022-09-22

## 2022-09-21 RX ORDER — MISOPROSTOL 200 UG/1
800 TABLET ORAL PRN
Status: DISCONTINUED | OUTPATIENT
Start: 2022-09-21 | End: 2022-09-21

## 2022-09-21 RX ORDER — METHYLERGONOVINE MALEATE 0.2 MG/ML
200 INJECTION INTRAVENOUS PRN
Status: DISCONTINUED | OUTPATIENT
Start: 2022-09-21 | End: 2022-09-22

## 2022-09-21 RX ORDER — SODIUM CHLORIDE, SODIUM LACTATE, POTASSIUM CHLORIDE, AND CALCIUM CHLORIDE .6; .31; .03; .02 G/100ML; G/100ML; G/100ML; G/100ML
500 INJECTION, SOLUTION INTRAVENOUS PRN
Status: DISCONTINUED | OUTPATIENT
Start: 2022-09-21 | End: 2022-09-22

## 2022-09-21 RX ORDER — SODIUM CHLORIDE 9 MG/ML
25 INJECTION, SOLUTION INTRAVENOUS PRN
Status: DISCONTINUED | OUTPATIENT
Start: 2022-09-21 | End: 2022-09-22

## 2022-09-21 RX ORDER — SODIUM CHLORIDE, SODIUM LACTATE, POTASSIUM CHLORIDE, AND CALCIUM CHLORIDE .6; .31; .03; .02 G/100ML; G/100ML; G/100ML; G/100ML
1000 INJECTION, SOLUTION INTRAVENOUS PRN
Status: DISCONTINUED | OUTPATIENT
Start: 2022-09-21 | End: 2022-09-22

## 2022-09-21 RX ORDER — NALOXONE HYDROCHLORIDE 0.4 MG/ML
INJECTION, SOLUTION INTRAMUSCULAR; INTRAVENOUS; SUBCUTANEOUS PRN
Status: DISCONTINUED | OUTPATIENT
Start: 2022-09-21 | End: 2022-09-22

## 2022-09-21 RX ORDER — ONDANSETRON 2 MG/ML
4 INJECTION INTRAMUSCULAR; INTRAVENOUS EVERY 6 HOURS PRN
Status: DISCONTINUED | OUTPATIENT
Start: 2022-09-21 | End: 2022-09-22

## 2022-09-21 RX ORDER — LIDOCAINE HYDROCHLORIDE 10 MG/ML
INJECTION, SOLUTION INFILTRATION; PERINEURAL
Status: DISCONTINUED
Start: 2022-09-21 | End: 2022-09-22 | Stop reason: WASHOUT

## 2022-09-21 RX ORDER — SODIUM CHLORIDE 0.9 % (FLUSH) 0.9 %
5-40 SYRINGE (ML) INJECTION PRN
Status: DISCONTINUED | OUTPATIENT
Start: 2022-09-21 | End: 2022-09-22

## 2022-09-21 RX ORDER — ACETAMINOPHEN 650 MG
TABLET, EXTENDED RELEASE ORAL
Status: DISCONTINUED
Start: 2022-09-21 | End: 2022-09-22

## 2022-09-21 RX ORDER — SODIUM CHLORIDE 0.9 % (FLUSH) 0.9 %
5-40 SYRINGE (ML) INJECTION EVERY 12 HOURS SCHEDULED
Status: DISCONTINUED | OUTPATIENT
Start: 2022-09-21 | End: 2022-09-22

## 2022-09-21 RX ORDER — SODIUM CHLORIDE, SODIUM LACTATE, POTASSIUM CHLORIDE, CALCIUM CHLORIDE 600; 310; 30; 20 MG/100ML; MG/100ML; MG/100ML; MG/100ML
INJECTION, SOLUTION INTRAVENOUS CONTINUOUS
Status: DISCONTINUED | OUTPATIENT
Start: 2022-09-21 | End: 2022-09-22

## 2022-09-21 RX ADMIN — SODIUM CHLORIDE, POTASSIUM CHLORIDE, SODIUM LACTATE AND CALCIUM CHLORIDE: 600; 310; 30; 20 INJECTION, SOLUTION INTRAVENOUS at 13:04

## 2022-09-21 RX ADMIN — Medication 15 ML/HR: at 23:09

## 2022-09-21 RX ADMIN — Medication 25 MCG: at 17:01

## 2022-09-21 RX ADMIN — Medication 1 MILLI-UNITS/MIN: at 21:05

## 2022-09-21 RX ADMIN — Medication 25 MCG: at 13:04

## 2022-09-21 NOTE — H&P
Department of Obstetrics and Gynecology  Physician Assistant Obstetrics History and Physical      HISTORY OF PRESENT ILLNESS:      The patient is a 27 y.o.  4 parity 2 at 44 weeks' 1 days' gestation presents to L&D from home for scheduled IOL. Current obstetric history is significant for:  Hypothyroidism  History of pyelonephritis at 14 weeks gestation    Estimated Due Date:  2022  Contractions: Yes  Leaking of fluid: no  Bleeding:  No  Perceived fetal movement: Good          PAST OB HISTORY:  OB History    Para Term  AB Living   4 2 2   1 2   SAB IAB Ectopic Molar Multiple Live Births             2      # Outcome Date GA Lbr Jono/2nd Weight Sex Delivery Anes PTL Lv   4 Current            3 AB 21 14w0d    SAB      2 Term 17 40w0d  8 lb 10 oz (3.912 kg) M Vag-Spont EPI N RAZIA   1 Term 07/06/15 39w4d   F Vag-Spont   RAZIA           Pre-eclampsia:  No      :  No      D & C:  Yes       Cerclage:  No      LEEP:  No      Myomectomy:  No       Labor: No    Past Medical History:  Denies hypertension, diabetes, asthma, cardiac disease  Diagnosis Date    Abnormal Pap smear of cervix 2013    HPV once; normal since    HPV (human papilloma virus) anogenital infection     Hypothyroidism     taking synthroid     Kidney infection     Kidney stone 2005    * Covid 19 \"right before current pregnancy\"    Past Surgical History:    Procedure Laterality Date    COLPOSCOPY      DILATION AND CURETTAGE      TYMPANOSTOMY TUBE PLACEMENT          Social History:    Reports that she has never smoked. She has never used smokeless tobacco. She reports that she does not currently use alcohol. She reports that she does not use drugs.      Family History:  Paternal grandfather diabetes and HTN, otherwise noncontributory    Blood type: A positive  Antibody screen: Negative  CBC:   Lab Results   Component Value Date    WBC 9.0 2022    HGB 13.4 2022    HCT 38.3 2022 accelerations: present;  decelerations: absent  Cervix:  DILATION: fingertip  EFFACEMENT: thick  STATION:  -3 cm  Contraction frequency:  occasional  Membranes:  Intact  Extremities: (-) edema      ASSESSMENT:   28 yo  A1 UP at 39 weeks' 1 days' gestation    Here for scheduled IOL         Plan: Orders obtained by RN from Dr. Delphine Latham:  EFM  Admit, anticipate normal delivery, routine labor orders  Other: Cytotec      Electronically signed by Jonn Hilliard PA-C on 2022 at 4:02 PM

## 2022-09-21 NOTE — ANESTHESIA PRE PROCEDURE
Department of Anesthesiology  Preprocedure Note       Name:  Frandy Quiroz   Age:  27 y.o.  :  1990                                          MRN:  [id number]         Date:  2022      Surgeon: * No surgeons listed *    Procedure: * No procedures listed *    Medications prior to admission:   Prior to Admission medications    Medication Sig Start Date End Date Taking?  Authorizing Provider   cefdinir (OMNICEF) 300 MG capsule Take 300 mg by mouth daily    Historical Provider, MD   aspirin 81 MG EC tablet Take 81 mg by mouth daily  Patient not taking: Reported on 2022    Historical Provider, MD   levothyroxine (SYNTHROID) 25 MCG tablet Take 25 mcg by mouth Daily    Historical Provider, MD   Prenatal Vit-Fe Fumarate-FA (PRENATAL VITAMIN) 27-0.8 MG TABS Take 1 tablet by mouth daily    Historical Provider, MD       Current medications:    Current Facility-Administered Medications   Medication Dose Route Frequency Provider Last Rate Last Admin    lactated ringers infusion   IntraVENous Continuous Ashley Pryor  mL/hr at 22 1304 New Bag at 22 1304    lactated ringers bolus  500 mL IntraVENous PRN Ashley Pryor MD        Or    lactated ringers bolus  1,000 mL IntraVENous PRN Ashley Pryor MD        sodium chloride flush 0.9 % injection 5-40 mL  5-40 mL IntraVENous 2 times per day Ashley Pryor MD        sodium chloride flush 0.9 % injection 5-40 mL  5-40 mL IntraVENous PRN Ashley Pryor MD        0.9 % sodium chloride infusion  25 mL IntraVENous PRN Ashley Pryor MD        methylergonovine (METHERGINE) injection 200 mcg  200 mcg IntraMUSCular PRN Ashley Pryor MD        carboprost (HEMABATE) injection 250 mcg  250 mcg IntraMUSCular PRN Ashley Pryor MD        miSOPROStol (CYTOTEC) tablet 800 mcg  800 mcg Rectal PRN Ashley Pryor MD        tranexamic acid (CYCLOKAPRON) 1000 mg in sodium chloride 0.9 % 50 mL IVPB  1,000 mg IntraVENous Once PRN Liz Brandt MD        oxytocin (PITOCIN) 30 units in 500 mL infusion  87.3 rajani-units/min IntraVENous Continuous PRN Liz Brandt MD        And    oxytocin (PITOCIN) 10 unit bolus from the bag  10 Units IntraVENous PRN Liz Brandt MD        ondansetron [facility] injection 4 mg  4 mg IntraVENous Q6H PRN Liz Brandt MD        miSOPROStol (CYTOTEC) pre-split tablet TABS 25 mcg  25 mcg Vaginal Q4H Liz Brandt MD   25 mcg at 09/21/22 1304    povidone-iodine (BETADINE) 10 % external solution             lidocaine 1 % injection                Allergies:  No Known Allergies    Problem List:    Patient Active Problem List   Diagnosis Code    34 weeks gestation of pregnancy Z3A.34    Septicemia ([facility] [address] A41.9    Hydronephrosis N13.30    Hypokalemia E87.6    Hyponatremia E87.1    Acidosis E87.2    Fourth pregnancy Z34.90    Normal pregnancy in third trimester Z34.93       Past Medical History:        Diagnosis Date    Abnormal Pap smear of cervix 2013    HPV once; normal since    HPV (human papilloma virus) anogenital infection 2013    Hypothyroidism     taking synthroid     Kidney infection     Kidney stone 2005       Past Surgical History:        Procedure Laterality Date    COLPOSCOPY  2013    DILATION AND CURETTAGE      TYMPANOSTOMY TUBE PLACEMENT  1991       Social History:    Social History     Tobacco Use    Smoking status: Never    Smokeless tobacco: Never   Substance Use Topics    Alcohol use: Not Currently                                Counseling given: Not Answered      Vital Signs (Current):   Vitals:    09/21/22 1227 09/21/22 1230 09/21/22 1559   BP:  130/71 (!) 145/73   Pulse:  (!) 127 93   Resp: 18 18 18   Temp: 36.6 °C (97.9 °F) 36.6 °C (97.9 °F) 36.8 °C (98.3 °F)   TempSrc: Oral Oral Oral   SpO2: 99% 99%                                               BP Readings from Last 3 Encounters:   09/21/22 (!) 145/73   09/15/22 137/68   08/18/22 137/80       NPO Status:                                                                                 BMI:   Wt Readings from Last 3 Encounters:   09/15/22 202 lb (91.6 kg)   08/18/22 194 lb (88 kg)   07/21/22 190 lb (86.2 kg)     There is no height or weight on file to calculate BMI.    CBC:   Lab Results   Component Value Date/Time    WBC 9.0 09/21/2022 12:35 PM    RBC 4.06 09/21/2022 12:35 PM    HGB 13.4 09/21/2022 12:35 PM    HCT 38.3 09/21/2022 12:35 PM    MCV 94.3 09/21/2022 12:35 PM    RDW 13.0 09/21/2022 12:35 PM     09/21/2022 12:35 PM       CMP:   Lab Results   Component Value Date/Time     04/02/2022 03:51 AM    K 3.8 04/02/2022 03:51 AM     04/02/2022 03:51 AM    CO2 20 04/02/2022 03:51 AM    BUN 5 04/02/2022 03:51 AM    CREATININE 0.5 04/02/2022 03:51 AM    GFRAA >60 04/02/2022 03:51 AM    LABGLOM >60 04/02/2022 03:51 AM    GLUCOSE 87 04/02/2022 03:51 AM    PROT 5.5 04/02/2022 03:51 AM    CALCIUM 8.2 04/02/2022 03:51 AM    BILITOT <0.2 04/02/2022 03:51 AM    ALKPHOS 37 04/02/2022 03:51 AM    AST 17 04/02/2022 03:51 AM    ALT 14 04/02/2022 03:51 AM       POC Tests: No results for input(s): POCGLU, POCNA, POCK, POCCL, POCBUN, POCHEMO, POCHCT in the last 72 hours.     Coags: No results found for: PROTIME, INR, APTT    HCG (If Applicable): No results found for: PREGTESTUR, PREGSERUM, HCG, HCGQUANT     ABGs: No results found for: PHART, PO2ART, FXT4RDN, WFO7GMA, BEART, K8GREOME     Type & Screen (If Applicable):  No results found for: LABABO, LABRH    Drug/Infectious Status (If Applicable):  No results found for: HIV, HEPCAB    COVID-19 Screening (If Applicable): No results found for: COVID19        Anesthesia Evaluation  Patient summary reviewed and Nursing notes reviewed no history of anesthetic complications:   Airway: Mallampati: I  TM distance: >3 FB   Neck ROM: full  Mouth opening: < 3 FB   Dental: normal exam Pulmonary:Negative Pulmonary ROS and normal exam  breath sounds clear to auscultation                             Cardiovascular:Negative CV ROS            Rhythm: regular  Rate: normal                    Neuro/Psych:   Negative Neuro/Psych ROS              GI/Hepatic/Renal:   (+) GERD: well controlled, renal disease ( Pylo on cefdinir ):,           Endo/Other:    (+) hypothyroidism::., .                 Abdominal:             Vascular: negative vascular ROS. Other Findings:           Anesthesia Plan      general, spinal and epidural     ASA 2             Anesthetic plan and risks discussed with patient. Use of blood products discussed with patient whom consented to blood products. Plan discussed with CRNA and attending.                     Minor Forbes RN   9/21/2022

## 2022-09-21 NOTE — PROGRESS NOTES
Dr. Destiney Loyd called in, updated on SVE and irregular ctxs, said ok to give next dose of cytotec at 1700

## 2022-09-21 NOTE — PROGRESS NOTES
Patient 39w1d, 4/2,here for IOL. Denies any bleeding, leaking, still feeling baby move well. Dr. Marcelina Street at bedside and orders for cytotec received.

## 2022-09-22 PROCEDURE — 2580000003 HC RX 258: Performed by: OBSTETRICS & GYNECOLOGY

## 2022-09-22 PROCEDURE — 1220000000 HC SEMI PRIVATE OB R&B

## 2022-09-22 PROCEDURE — 88307 TISSUE EXAM BY PATHOLOGIST: CPT

## 2022-09-22 PROCEDURE — 0KQM0ZZ REPAIR PERINEUM MUSCLE, OPEN APPROACH: ICD-10-PCS | Performed by: OBSTETRICS & GYNECOLOGY

## 2022-09-22 PROCEDURE — 6370000000 HC RX 637 (ALT 250 FOR IP): Performed by: OBSTETRICS & GYNECOLOGY

## 2022-09-22 PROCEDURE — 51701 INSERT BLADDER CATHETER: CPT

## 2022-09-22 PROCEDURE — 7200000001 HC VAGINAL DELIVERY

## 2022-09-22 RX ORDER — PANTOPRAZOLE SODIUM 40 MG/1
40 TABLET, DELAYED RELEASE ORAL DAILY PRN
Status: DISCONTINUED | OUTPATIENT
Start: 2022-09-22 | End: 2022-09-24 | Stop reason: HOSPADM

## 2022-09-22 RX ORDER — LEVOTHYROXINE SODIUM 0.03 MG/1
25 TABLET ORAL DAILY
Status: DISCONTINUED | OUTPATIENT
Start: 2022-09-22 | End: 2022-09-24 | Stop reason: HOSPADM

## 2022-09-22 RX ORDER — MODIFIED LANOLIN
OINTMENT (GRAM) TOPICAL PRN
Status: DISCONTINUED | OUTPATIENT
Start: 2022-09-22 | End: 2022-09-24 | Stop reason: HOSPADM

## 2022-09-22 RX ORDER — BISACODYL 10 MG
10 SUPPOSITORY, RECTAL RECTAL DAILY PRN
Status: DISCONTINUED | OUTPATIENT
Start: 2022-09-22 | End: 2022-09-24 | Stop reason: HOSPADM

## 2022-09-22 RX ORDER — SODIUM CHLORIDE 0.9 % (FLUSH) 0.9 %
5-40 SYRINGE (ML) INJECTION EVERY 12 HOURS SCHEDULED
Status: DISCONTINUED | OUTPATIENT
Start: 2022-09-22 | End: 2022-09-24 | Stop reason: HOSPADM

## 2022-09-22 RX ORDER — SODIUM CHLORIDE 0.9 % (FLUSH) 0.9 %
5-40 SYRINGE (ML) INJECTION PRN
Status: DISCONTINUED | OUTPATIENT
Start: 2022-09-22 | End: 2022-09-24 | Stop reason: HOSPADM

## 2022-09-22 RX ORDER — SIMETHICONE 80 MG
80 TABLET,CHEWABLE ORAL EVERY 6 HOURS PRN
Status: DISCONTINUED | OUTPATIENT
Start: 2022-09-22 | End: 2022-09-24 | Stop reason: HOSPADM

## 2022-09-22 RX ORDER — ONDANSETRON 4 MG/1
4 TABLET, ORALLY DISINTEGRATING ORAL EVERY 6 HOURS PRN
Status: DISCONTINUED | OUTPATIENT
Start: 2022-09-22 | End: 2022-09-24 | Stop reason: HOSPADM

## 2022-09-22 RX ORDER — SODIUM CHLORIDE 9 MG/ML
INJECTION, SOLUTION INTRAVENOUS PRN
Status: DISCONTINUED | OUTPATIENT
Start: 2022-09-22 | End: 2022-09-24 | Stop reason: HOSPADM

## 2022-09-22 RX ORDER — ASPIRIN 81 MG/1
81 TABLET ORAL DAILY
Status: DISCONTINUED | OUTPATIENT
Start: 2022-09-23 | End: 2022-09-24 | Stop reason: HOSPADM

## 2022-09-22 RX ORDER — SODIUM CHLORIDE, SODIUM LACTATE, POTASSIUM CHLORIDE, CALCIUM CHLORIDE 600; 310; 30; 20 MG/100ML; MG/100ML; MG/100ML; MG/100ML
INJECTION, SOLUTION INTRAVENOUS CONTINUOUS
Status: DISCONTINUED | OUTPATIENT
Start: 2022-09-22 | End: 2022-09-24 | Stop reason: HOSPADM

## 2022-09-22 RX ORDER — FERROUS SULFATE 325(65) MG
325 TABLET ORAL
Status: DISCONTINUED | OUTPATIENT
Start: 2022-09-22 | End: 2022-09-24 | Stop reason: HOSPADM

## 2022-09-22 RX ORDER — DOCUSATE SODIUM 100 MG/1
100 CAPSULE, LIQUID FILLED ORAL 2 TIMES DAILY
Status: DISCONTINUED | OUTPATIENT
Start: 2022-09-22 | End: 2022-09-24 | Stop reason: HOSPADM

## 2022-09-22 RX ORDER — IBUPROFEN 800 MG/1
800 TABLET ORAL EVERY 8 HOURS PRN
Status: DISCONTINUED | OUTPATIENT
Start: 2022-09-22 | End: 2022-09-24 | Stop reason: HOSPADM

## 2022-09-22 RX ADMIN — IBUPROFEN 800 MG: 800 TABLET, FILM COATED ORAL at 13:34

## 2022-09-22 RX ADMIN — IBUPROFEN 800 MG: 800 TABLET, FILM COATED ORAL at 21:41

## 2022-09-22 RX ADMIN — IBUPROFEN 800 MG: 800 TABLET, FILM COATED ORAL at 05:20

## 2022-09-22 RX ADMIN — LEVOTHYROXINE SODIUM 25 MCG: 25 TABLET ORAL at 07:18

## 2022-09-22 RX ADMIN — Medication: at 05:21

## 2022-09-22 RX ADMIN — SODIUM CHLORIDE, PRESERVATIVE FREE 10 ML: 5 INJECTION INTRAVENOUS at 10:02

## 2022-09-22 RX ADMIN — DOCUSATE SODIUM 100 MG: 100 CAPSULE, LIQUID FILLED ORAL at 21:41

## 2022-09-22 RX ADMIN — DOCUSATE SODIUM 100 MG: 100 CAPSULE, LIQUID FILLED ORAL at 10:00

## 2022-09-22 RX ADMIN — Medication 166.7 ML: at 01:29

## 2022-09-22 ASSESSMENT — PAIN DESCRIPTION - DESCRIPTORS
DESCRIPTORS: DISCOMFORT;CRAMPING
DESCRIPTORS: ACHING;CRAMPING;DISCOMFORT

## 2022-09-22 ASSESSMENT — PAIN SCALES - GENERAL
PAINLEVEL_OUTOF10: 6
PAINLEVEL_OUTOF10: 1
PAINLEVEL_OUTOF10: 1

## 2022-09-22 ASSESSMENT — PAIN - FUNCTIONAL ASSESSMENT
PAIN_FUNCTIONAL_ASSESSMENT: ACTIVITIES ARE NOT PREVENTED
PAIN_FUNCTIONAL_ASSESSMENT: ACTIVITIES ARE NOT PREVENTED

## 2022-09-22 ASSESSMENT — PAIN DESCRIPTION - ONSET: ONSET: GRADUAL

## 2022-09-22 ASSESSMENT — PAIN DESCRIPTION - ORIENTATION
ORIENTATION: LOWER
ORIENTATION: LOWER;RIGHT

## 2022-09-22 ASSESSMENT — PAIN DESCRIPTION - LOCATION
LOCATION: ABDOMEN
LOCATION: ABDOMEN

## 2022-09-22 ASSESSMENT — PAIN DESCRIPTION - PAIN TYPE: TYPE: ACUTE PAIN

## 2022-09-22 ASSESSMENT — PAIN DESCRIPTION - FREQUENCY: FREQUENCY: INTERMITTENT

## 2022-09-22 NOTE — PROGRESS NOTES
Patient admitted into room, oriented to surroundings. IV in place and infusing as ordered. Fundus firm and to the left, small amount of bleeding, no clots. Admission Packet/education reviewed with Pt. Infant safety/education completed. Pt refuses the T-DAP vaccine during pregnancy. PPD questionnaire given to pt. Instructed to call for assistance.

## 2022-09-22 NOTE — PROGRESS NOTES
Patient c/o pain. No relief from epidural, despite position changes and patient pushing bolus button from epidural. sve at this time 4-5/80/-1. Anesthesia notified.

## 2022-09-22 NOTE — ANESTHESIA POSTPROCEDURE EVALUATION
Department of Anesthesiology  Postprocedure Note    Patient: Alejandro Stubbs  MRN: [id number]  YOB: 1990  Date of evaluation: 9/22/2022      Procedure Summary     Date: 09/21/22 Room / Location:     Anesthesia Start: 2256 Anesthesia Stop: 09/22/22 0124    Procedure: Labor Analgesia Diagnosis:     Scheduled Providers:  Responsible Provider: Fanny Wang MD    Anesthesia Type: general, spinal, epidural ASA Status: 2          Anesthesia Type: No value filed. Judy Phase I: Judy Score: 10    Judy Phase II:        Anesthesia Post Evaluation    Patient location during evaluation: bedside  Patient participation: complete - patient participated  Level of consciousness: awake and alert  Pain score: 1  Airway patency: patent  Nausea & Vomiting: no nausea and no vomiting  Complications: no  Cardiovascular status: hemodynamically stable  Respiratory status: acceptable and room air  Hydration status: stable  Comments: Pt denies questions or needs at this time.    Multimodal analgesia pain management approach

## 2022-09-22 NOTE — PROGRESS NOTES
Dr Dana Tomas at bedside. Notified that patient just received epidural, no recent sve at this time. Notified patient is feeling contractions more on the right side, patient repositioned to right side at this time. Orders to continue plan of care.

## 2022-09-22 NOTE — LACTATION NOTE
Second time mom breast fed her last baby for 10 months. Her goal for this baby is one year. Mom stated baby is nursing well with the exception of him having congestion. Mom has a busy [de-identified] year old at home and asked about pumping breast milk because nursing baby will take up time. Explained that pumping milk takes time and may not be as effective as direct breastfeeding. Discussed nursing strategies and still being able to attend to older children. Mom has a history of clogged ducts and mastitis. We discussed remedies for clogged ducts. Mom has an electric breast pump for home. Went over breastfeeding resources. Baby is asleep. Encouraged mom to call me to observe baby during a breastfeed.

## 2022-09-22 NOTE — ANESTHESIA PROCEDURE NOTES
Epidural Block    Patient location during procedure: OB  Reason for block: labor epidural  Staffing  Performed: resident/CRNA   Anesthesiologist: Mercedes Dubose MD  Resident/CRNA: Noris Alexander, APRN - CRNA  Epidural  Patient position: sitting  Prep: ChloraPrep  Patient monitoring: cardiac monitor, continuous pulse ox and frequent blood pressure checks  Approach: midline  Location: L3-4  Injection technique: hanging drop  Provider prep: mask and sterile gloves  Needle  Needle type: Tuohy   Needle gauge: 18 G  Needle length: 3.5 in  Needle insertion depth: 7 cm  Catheter type: end hole  Catheter size: 20 G.   Catheter at skin depth: 15 cm  Test dose: negativeCatheter Secured: tegaderm and tape  Assessment  Hemodynamics: stable  Attempts: 1  Outcomes: uncomplicated and patient tolerated procedure well  Preanesthetic Checklist  Completed: patient identified, IV checked, site marked, risks and benefits discussed, surgical/procedural consents, equipment checked, pre-op evaluation, timeout performed, anesthesia consent given, oxygen available and monitors applied/VS acknowledged

## 2022-09-23 LAB
HCT VFR BLD CALC: 34.7 % (ref 34–48)
HEMOGLOBIN: 11.7 G/DL (ref 11.5–15.5)

## 2022-09-23 PROCEDURE — 36415 COLL VENOUS BLD VENIPUNCTURE: CPT

## 2022-09-23 PROCEDURE — 85018 HEMOGLOBIN: CPT

## 2022-09-23 PROCEDURE — 85014 HEMATOCRIT: CPT

## 2022-09-23 PROCEDURE — 1220000000 HC SEMI PRIVATE OB R&B

## 2022-09-23 PROCEDURE — 6370000000 HC RX 637 (ALT 250 FOR IP): Performed by: OBSTETRICS & GYNECOLOGY

## 2022-09-23 RX ORDER — IBUPROFEN 800 MG/1
800 TABLET ORAL EVERY 8 HOURS PRN
Qty: 120 TABLET | Refills: 0 | Status: SHIPPED | OUTPATIENT
Start: 2022-09-23

## 2022-09-23 RX ADMIN — DOCUSATE SODIUM 100 MG: 100 CAPSULE, LIQUID FILLED ORAL at 21:06

## 2022-09-23 RX ADMIN — DOCUSATE SODIUM 100 MG: 100 CAPSULE, LIQUID FILLED ORAL at 10:47

## 2022-09-23 RX ADMIN — LEVOTHYROXINE SODIUM 25 MCG: 25 TABLET ORAL at 06:06

## 2022-09-23 RX ADMIN — IBUPROFEN 800 MG: 800 TABLET, FILM COATED ORAL at 16:13

## 2022-09-23 RX ADMIN — ASPIRIN 81 MG: 81 TABLET, COATED ORAL at 10:47

## 2022-09-23 RX ADMIN — IBUPROFEN 800 MG: 800 TABLET, FILM COATED ORAL at 06:06

## 2022-09-23 ASSESSMENT — PAIN DESCRIPTION - ORIENTATION
ORIENTATION: MID
ORIENTATION: LOWER
ORIENTATION: LOWER

## 2022-09-23 ASSESSMENT — PAIN - FUNCTIONAL ASSESSMENT
PAIN_FUNCTIONAL_ASSESSMENT: ACTIVITIES ARE NOT PREVENTED

## 2022-09-23 ASSESSMENT — PAIN DESCRIPTION - LOCATION
LOCATION: ABDOMEN;PERINEUM
LOCATION: ABDOMEN
LOCATION: ABDOMEN

## 2022-09-23 ASSESSMENT — PAIN DESCRIPTION - ONSET: ONSET: GRADUAL

## 2022-09-23 ASSESSMENT — PAIN DESCRIPTION - DESCRIPTORS
DESCRIPTORS: CRAMPING;DISCOMFORT;SORE
DESCRIPTORS: ACHING;DISCOMFORT
DESCRIPTORS: DISCOMFORT;CRAMPING

## 2022-09-23 ASSESSMENT — PAIN SCALES - GENERAL
PAINLEVEL_OUTOF10: 2
PAINLEVEL_OUTOF10: 1
PAINLEVEL_OUTOF10: 2

## 2022-09-23 ASSESSMENT — PAIN DESCRIPTION - PAIN TYPE: TYPE: ACUTE PAIN

## 2022-09-23 ASSESSMENT — PAIN DESCRIPTION - FREQUENCY: FREQUENCY: INTERMITTENT

## 2022-09-23 NOTE — PLAN OF CARE
Problem: Vaginal Birth or  Section  Goal: Fetal and maternal status remain reassuring during the birth process  Description:  Birth OB-Pregnancy care plan goal which identifies if the fetal and maternal status remain reassuring during the birth process  Outcome: Progressing     Problem: Postpartum  Goal: Experiences normal postpartum course  Description:  Postpartum OB-Pregnancy care plan goal which identifies if the mother is experiencing a normal postpartum course  Outcome: Progressing  Goal: Appropriate maternal -  bonding  Description:  Postpartum OB-Pregnancy care plan goal which identifies if the mother and  are bonding appropriately  Outcome: Progressing  Goal: Establishment of infant feeding pattern  Description:  Postpartum OB-Pregnancy care plan goal which identifies if the mother is establishing a feeding pattern with their   Outcome: Progressing  Goal: Incisions, wounds, or drain sites healing without S/S of infection  Outcome: Progressing     Problem: Pain  Goal: Verbalizes/displays adequate comfort level or baseline comfort level  Outcome: Progressing     Problem: Safety - Adult  Goal: Free from fall injury  Outcome: Progressing     Problem: Discharge Planning  Goal: Discharge to home or other facility with appropriate resources  Outcome: Progressing

## 2022-09-23 NOTE — PROGRESS NOTES
[facility] [place] [facility] screening results were discussed with parent. Questions answered. Brochure given to parent. Advised to monitor developmental milestones and contact physician for any concerns.    Jeniffer Toscano

## 2022-09-23 NOTE — LACTATION NOTE
Mom continues to exclusively breastfeed her baby. Answered questions regarding cluster feeding, pumping and signs of adequate milk transfer. Encouraged mom to call us with questions, concerns or for assistance.

## 2022-09-23 NOTE — PROGRESS NOTES
CLINICAL PHARMACY NOTE: MEDS TO BEDS    Total # of Prescriptions Filled: 1   The following medications were delivered to the patient:  Ibu 800      Additional Documentation:

## 2022-09-23 NOTE — PLAN OF CARE
Problem: Vaginal Birth or  Section  Goal: Fetal and maternal status remain reassuring during the birth process  Description:  Birth OB-Pregnancy care plan goal which identifies if the fetal and maternal status remain reassuring during the birth process  2022 1214 by Anish Smith RN  Outcome: Progressing     Problem: Postpartum  Goal: Experiences normal postpartum course  Description:  Postpartum OB-Pregnancy care plan goal which identifies if the mother is experiencing a normal postpartum course  2022 1214 by Anish Smith RN  Outcome: Progressing     Problem: Postpartum  Goal: Appropriate maternal -  bonding  Description:  Postpartum OB-Pregnancy care plan goal which identifies if the mother and  are bonding appropriately  2022 1214 by Anish Smith RN  Outcome: Progressing     Problem: Postpartum  Goal: Establishment of infant feeding pattern  Description:  Postpartum OB-Pregnancy care plan goal which identifies if the mother is establishing a feeding pattern with their   2022 1214 by Anish Smith RN  Outcome: Progressing     Problem: Postpartum  Goal: Incisions, wounds, or drain sites healing without S/S of infection  2022 1214 by Anish Smith RN  Outcome: Progressing     Problem: Pain  Goal: Verbalizes/displays adequate comfort level or baseline comfort level  2022 1214 by Anish Smith RN  Outcome: Progressing  Flowsheets (Taken 2022 1050)  Verbalizes/displays adequate comfort level or baseline comfort level: Encourage patient to monitor pain and request assistance     Problem: Safety - Adult  Goal: Free from fall injury  2022 1214 by Anish Smith RN  Outcome: Progressing     Problem: Discharge Planning  Goal: Discharge to home or other facility with appropriate resources  2022 1214 by Anish Smith RN  Outcome: Progressing

## 2022-09-24 VITALS
HEART RATE: 89 BPM | SYSTOLIC BLOOD PRESSURE: 108 MMHG | OXYGEN SATURATION: 97 % | TEMPERATURE: 98.2 F | DIASTOLIC BLOOD PRESSURE: 57 MMHG | RESPIRATION RATE: 16 BRPM

## 2022-09-24 PROCEDURE — 6370000000 HC RX 637 (ALT 250 FOR IP): Performed by: OBSTETRICS & GYNECOLOGY

## 2022-09-24 RX ADMIN — IBUPROFEN 800 MG: 800 TABLET, FILM COATED ORAL at 09:19

## 2022-09-24 RX ADMIN — DOCUSATE SODIUM 100 MG: 100 CAPSULE, LIQUID FILLED ORAL at 09:19

## 2022-09-24 RX ADMIN — LEVOTHYROXINE SODIUM 25 MCG: 25 TABLET ORAL at 07:19

## 2022-09-24 RX ADMIN — ASPIRIN 81 MG: 81 TABLET, COATED ORAL at 09:19

## 2022-09-24 NOTE — PLAN OF CARE
Problem: Vaginal Birth or  Section  Goal: Fetal and maternal status remain reassuring during the birth process  Description:  Birth OB-Pregnancy care plan goal which identifies if the fetal and maternal status remain reassuring during the birth process  2022 by Chloé Mcknight RN  Outcome: Completed  2022 121 by Carolyn Aschoff, RN  Outcome: Progressing     Problem: Postpartum  Goal: Experiences normal postpartum course  Description:  Postpartum OB-Pregnancy care plan goal which identifies if the mother is experiencing a normal postpartum course  2022 by Chloé Mcknight RN  Outcome: Completed  2022 1214 by Carolyn Aschoff, RN  Outcome: Progressing  Goal: Appropriate maternal -  bonding  Description:  Postpartum OB-Pregnancy care plan goal which identifies if the mother and  are bonding appropriately  2022 by Chloé Mcknight RN  Outcome: Completed  2022 121 by Carolyn Aschoff, RN  Outcome: Progressing  Goal: Establishment of infant feeding pattern  Description:  Postpartum OB-Pregnancy care plan goal which identifies if the mother is establishing a feeding pattern with their   2022 by Chloé Mcknight RN  Outcome: Completed  2022 121 by Carolyn Aschoff, RN  Outcome: Progressing  Goal: Incisions, wounds, or drain sites healing without S/S of infection  2022 by Chloé Mcknight RN  Outcome: Completed  2022 121 by Carolyn Aschoff, RN  Outcome: Progressing     Problem: Pain  Goal: Verbalizes/displays adequate comfort level or baseline comfort level  2022 by Chloé Mcknight RN  Outcome: Completed  2022 1214 by Carolyn Aschoff, RN  Outcome: Progressing  Flowsheets (Taken 2022 1050)  Verbalizes/displays adequate comfort level or baseline comfort level: Encourage patient to monitor pain and request assistance     Problem: Safety - Adult  Goal: Free from fall injury  2022 by Chloé Mcknight RN  Outcome: Completed  9/23/2022 1214 by Sindhu Lopez RN  Outcome: Progressing     Problem: Discharge Planning  Goal: Discharge to home or other facility with appropriate resources  9/23/2022 2316 by Hernesto Lerma RN  Outcome: Completed  9/23/2022 1214 by Sindhu Lopez RN  Outcome: Progressing

## 2022-09-24 NOTE — DISCHARGE INSTRUCTIONS
Follow-up with your OB doctor in 6 weeks for vaginal delivery unless otherwise instructed. Call office for an appointment. For breastfeeding support, you can contact our lactation specialists at [phone] or [phone]    DIET  Eat a well balanced diet focusing on foods high in fiber and protein  Drink plenty of fluids especially water. To avoid constipation you may take a mild stool softener as recommended by your doctor or midwife. ACTIVITY  Gradually increase your activity. Resume exercise regimen only after advised by your doctor or midwife. Avoid lifting anything heavier than your baby or a gallon of milk for SIX weeks. Avoid driving until your doctor or midwife has given their approval.  Eppie Fermo slowly from a lying to sitting and then a standing position. Climb stairs one at a time. Use caution when carrying your baby up and down the stairs. No sexual activity for 6 weeks or until advised by your doctor - Nothing in vagina: intercourse, tampons, or douching. Be prepared to discuss family planning at your follow-up OB visit. You may feel tired or have a lack of energy. You may continue your prenatal vitamin to replenish nutrients post delivery. Nap when baby naps to catch up on sleep. May return to work or school in 6 weeks or as directed by OB. EMOTIONS  You may feed wilson, sad, teary, & overwhelmed. Contact your OB provider if you feel you may be showing signs of postpartum depression, or have thoughts of harming yourself or your infant. If infant will not stop crying, contact another adult for help or place infant in their crib on their back and take a break. NEVER shake your infant. BLEEDING  Vaginal bleeding will decrease in amount over the next few weeks. You will notice that as your activity increases, your flow may increase. This is your body's way of telling you, you need to take things easier and rest more often.   Call your OB/ER if you are saturating more than

## 2022-10-20 NOTE — DISCHARGE SUMMARY
Patient admitted September 21 at 39 weeks for scheduled induction of labor per [facility] due to history of pyelonephritis during this pregnancy. .  Cytotec was begun she received an epidural and from progressed to complete precipitously and male infant Apgars 9 and 9..    On postpartum day 1 no complaints vital signs stable care was advanced charge home the following day with precautions.     Physician following patient economist low function stable caregiver some family special rounds none instructions follow up in the office in 6 weeks

## 2024-11-07 NOTE — PROGRESS NOTES
Explained to pt that her rubella status is equivocal which would indicate need for MMR vaccine prior to discharge. She said she was aware of her rubella status and is refusing MMR at this time. She is going to follow-up with her PCP outpatient. [Negative] : Heme/Lymph